# Patient Record
Sex: MALE | Race: WHITE | NOT HISPANIC OR LATINO | Employment: OTHER | URBAN - METROPOLITAN AREA
[De-identification: names, ages, dates, MRNs, and addresses within clinical notes are randomized per-mention and may not be internally consistent; named-entity substitution may affect disease eponyms.]

---

## 2023-03-30 ENCOUNTER — TELEPHONE (OUTPATIENT)
Dept: PRIMARY CARE | Facility: CLINIC | Age: 73
End: 2023-03-30
Payer: MEDICARE

## 2023-03-30 DIAGNOSIS — U07.1 COVID-19: Primary | ICD-10-CM

## 2023-03-30 PROBLEM — R50.9 FEVER: Status: ACTIVE | Noted: 2023-03-30

## 2023-03-30 PROBLEM — I25.10 CORONARY ARTERY DISEASE: Status: ACTIVE | Noted: 2023-03-30

## 2023-03-30 PROBLEM — K63.5 COLON POLYPS: Status: ACTIVE | Noted: 2023-03-30

## 2023-03-30 PROBLEM — E66.3 OVERWEIGHT: Status: ACTIVE | Noted: 2023-03-30

## 2023-03-30 PROBLEM — K21.9 GERD WITHOUT ESOPHAGITIS: Status: ACTIVE | Noted: 2023-03-30

## 2023-03-30 PROBLEM — J01.41 ACUTE RECURRENT PANSINUSITIS: Status: ACTIVE | Noted: 2023-03-30

## 2023-03-30 PROBLEM — B02.9 HERPES ZOSTER: Status: ACTIVE | Noted: 2023-03-30

## 2023-03-30 PROBLEM — R00.1 BRADYCARDIA: Status: ACTIVE | Noted: 2023-03-30

## 2023-03-30 PROBLEM — R00.2 PALPITATIONS: Status: ACTIVE | Noted: 2023-03-30

## 2023-03-30 PROBLEM — E78.00 PURE HYPERCHOLESTEROLEMIA: Status: ACTIVE | Noted: 2023-03-30

## 2023-03-30 PROBLEM — K13.70 LESION OF BUCCAL MUCOSA: Status: ACTIVE | Noted: 2023-03-30

## 2023-03-30 PROBLEM — R07.89 CHEST PAIN, ATYPICAL: Status: ACTIVE | Noted: 2023-03-30

## 2023-03-30 PROBLEM — Z86.39 HISTORY OF VITAMIN D DEFICIENCY: Status: ACTIVE | Noted: 2023-03-30

## 2023-03-30 PROBLEM — R53.83 FATIGUE: Status: ACTIVE | Noted: 2023-03-30

## 2023-03-30 RX ORDER — CLOPIDOGREL BISULFATE 75 MG/1
75 TABLET ORAL DAILY
COMMUNITY
End: 2023-12-18 | Stop reason: SDUPTHER

## 2023-03-30 RX ORDER — EZETIMIBE 10 MG/1
10 TABLET ORAL DAILY
COMMUNITY
End: 2024-05-22

## 2023-03-30 RX ORDER — NITROGLYCERIN 0.4 MG/1
0.4 TABLET SUBLINGUAL EVERY 5 MIN PRN
COMMUNITY

## 2023-03-30 RX ORDER — NIRMATRELVIR AND RITONAVIR 300-100 MG
3 KIT ORAL 2 TIMES DAILY
Qty: 30 TABLET | Refills: 0 | Status: SHIPPED | OUTPATIENT
Start: 2023-03-30 | End: 2023-04-04

## 2023-03-30 RX ORDER — ATORVASTATIN CALCIUM 80 MG/1
80 TABLET, FILM COATED ORAL DAILY
COMMUNITY
End: 2023-03-30 | Stop reason: SINTOL

## 2023-03-30 RX ORDER — FAMOTIDINE 40 MG/1
1 TABLET, FILM COATED ORAL 2 TIMES DAILY
COMMUNITY
Start: 2022-03-30

## 2023-03-30 RX ORDER — ZINC GLUCONATE 50 MG
500 TABLET ORAL DAILY
COMMUNITY

## 2023-03-30 RX ORDER — ERGOCALCIFEROL 1.25 MG/1
2 CAPSULE ORAL DAILY
COMMUNITY

## 2023-03-30 RX ORDER — ISOSORBIDE MONONITRATE 30 MG/1
30 TABLET, EXTENDED RELEASE ORAL DAILY
COMMUNITY

## 2023-03-30 RX ORDER — FLUTICASONE PROPIONATE 50 MCG
1 SPRAY, SUSPENSION (ML) NASAL DAILY
COMMUNITY
End: 2023-11-09 | Stop reason: SDUPTHER

## 2023-03-30 RX ORDER — ASPIRIN 81 MG/1
1 TABLET ORAL DAILY
COMMUNITY

## 2023-03-30 NOTE — TELEPHONE ENCOUNTER
Patient called stating he has fever, body aches, fatigue, sore throat and congestion. He has a positive COVID test this morning and wants to know if you can prescribe paxlovid to [facility] on file .

## 2023-04-22 LAB
APPEARANCE, URINE: CLEAR
BILIRUBIN, URINE: NEGATIVE
BLOOD, URINE: ABNORMAL
CALCIUM OXALATE CRYSTALS, URINE: ABNORMAL /HPF
COLOR, URINE: YELLOW
GLUCOSE, URINE: NEGATIVE MG/DL
KETONES, URINE: NEGATIVE MG/DL
LEUKOCYTE ESTERASE, URINE: NEGATIVE
MUCUS, URINE: ABNORMAL /LPF
NITRITE, URINE: NEGATIVE
PH, URINE: 5 (ref 5–8)
PROSTATE SPECIFIC AG (NG/ML) IN SER/PLAS: 8.65 NG/ML (ref 0–4)
PROTEIN, URINE: NEGATIVE MG/DL
RBC, URINE: 5 /HPF (ref 0–5)
SPECIFIC GRAVITY, URINE: 1.02 (ref 1–1.03)
UROBILINOGEN, URINE: <2 MG/DL (ref 0–1.9)
WBC, URINE: 5 /HPF (ref 0–5)

## 2023-05-04 LAB — PROSTATE SPECIFIC AG (NG/ML) IN SER/PLAS: 3.2 NG/ML (ref 0–4)

## 2023-10-03 ENCOUNTER — TELEPHONE (OUTPATIENT)
Dept: PRIMARY CARE | Facility: CLINIC | Age: 73
End: 2023-10-03
Payer: MEDICARE

## 2023-10-04 NOTE — TELEPHONE ENCOUNTER
Pt called stating that he has pulled a muscle mid to lower back 3 weeks ago and would like rx to be sent to pharmacy. Pt is also wondering if you recommend COVID buster and RSV to be done at pharmacy.

## 2023-10-05 DIAGNOSIS — S39.012A STRAIN OF LUMBAR REGION, INITIAL ENCOUNTER: Primary | ICD-10-CM

## 2023-10-05 RX ORDER — METHOCARBAMOL 500 MG/1
500 TABLET, FILM COATED ORAL 4 TIMES DAILY PRN
Qty: 40 TABLET | Refills: 3 | Status: SHIPPED | OUTPATIENT
Start: 2023-10-05 | End: 2024-06-01

## 2023-10-22 DIAGNOSIS — U07.1 COVID-19: Primary | ICD-10-CM

## 2023-11-02 ENCOUNTER — TELEPHONE (OUTPATIENT)
Dept: PRIMARY CARE | Facility: CLINIC | Age: 73
End: 2023-11-02
Payer: MEDICARE

## 2023-11-02 NOTE — TELEPHONE ENCOUNTER
Shaheen had covid 2 weeks ago and had paxlovid. He is feeling much better but wants to know how soon can he get the Covid Booster and the RSV after testing positive for covid?

## 2023-11-03 ENCOUNTER — LAB (OUTPATIENT)
Dept: LAB | Facility: LAB | Age: 73
End: 2023-11-03
Payer: MEDICARE

## 2023-11-03 DIAGNOSIS — C61 MALIGNANT NEOPLASM OF PROSTATE (MULTI): Primary | ICD-10-CM

## 2023-11-03 LAB — PSA SERPL-MCNC: 1.33 NG/ML

## 2023-11-03 PROCEDURE — 84153 ASSAY OF PSA TOTAL: CPT

## 2023-11-03 PROCEDURE — 36415 COLL VENOUS BLD VENIPUNCTURE: CPT

## 2023-11-07 DIAGNOSIS — J01.41 ACUTE RECURRENT PANSINUSITIS: Primary | ICD-10-CM

## 2023-11-08 PROBLEM — M79.642 LEFT HAND PAIN: Status: ACTIVE | Noted: 2023-08-07

## 2023-11-08 PROBLEM — Z86.010 HISTORY OF ADENOMATOUS POLYP OF COLON: Status: ACTIVE | Noted: 2023-11-08

## 2023-11-08 PROBLEM — E78.00 HYPERCHOLESTEROLEMIA WITHOUT HYPERTRIGLYCERIDEMIA: Status: ACTIVE | Noted: 2023-11-08

## 2023-11-08 PROBLEM — R13.10 DYSPHAGIA: Status: ACTIVE | Noted: 2023-11-08

## 2023-11-08 PROBLEM — M19.90 ARTHRITIS: Status: ACTIVE | Noted: 2023-11-08

## 2023-11-08 PROBLEM — M25.442 EFFUSION OF JOINT OF LEFT HAND: Status: ACTIVE | Noted: 2023-08-07

## 2023-11-08 PROBLEM — M85.80 OSTEOPENIA: Status: ACTIVE | Noted: 2023-11-08

## 2023-11-08 PROBLEM — M48.061 LUMBAR FORAMINAL STENOSIS: Status: ACTIVE | Noted: 2023-11-08

## 2023-11-08 PROBLEM — Z86.0101 HISTORY OF ADENOMATOUS POLYP OF COLON: Status: ACTIVE | Noted: 2023-11-08

## 2023-11-08 PROBLEM — I49.9 IRREGULAR HEART RHYTHM: Status: ACTIVE | Noted: 2023-11-08

## 2023-11-08 PROBLEM — M48.07 LUMBOSACRAL STENOSIS: Status: ACTIVE | Noted: 2023-11-08

## 2023-11-08 PROBLEM — M51.26 LUMBAR DISC HERNIATION: Status: ACTIVE | Noted: 2023-11-08

## 2023-11-08 PROBLEM — S60.00XA CONTUSION OF FINGER OF LEFT HAND: Status: ACTIVE | Noted: 2023-08-07

## 2023-11-08 PROBLEM — N20.0 KIDNEY STONES: Status: ACTIVE | Noted: 2023-11-08

## 2023-11-08 PROBLEM — M54.16 LUMBAR NEURITIS: Status: ACTIVE | Noted: 2023-11-08

## 2023-11-08 RX ORDER — ATORVASTATIN CALCIUM 80 MG/1
80 TABLET, FILM COATED ORAL DAILY
COMMUNITY

## 2023-11-08 RX ORDER — SODIUM, POTASSIUM,MAG SULFATES 17.5-3.13G
SOLUTION, RECONSTITUTED, ORAL ORAL
COMMUNITY
Start: 2022-03-30 | End: 2024-01-22

## 2023-11-08 RX ORDER — LEVOFLOXACIN 750 MG/1
TABLET ORAL
COMMUNITY
End: 2024-02-09 | Stop reason: WASHOUT

## 2023-11-08 RX ORDER — OMEPRAZOLE 20 MG/1
TABLET, DELAYED RELEASE ORAL
COMMUNITY
Start: 2010-01-26 | End: 2024-02-09 | Stop reason: WASHOUT

## 2023-11-08 RX ORDER — LANOLIN ALCOHOL/MO/W.PET/CERES
1 CREAM (GRAM) TOPICAL EVERY OTHER DAY
COMMUNITY
Start: 2023-01-03

## 2023-11-08 RX ORDER — SIMVASTATIN 20 MG/1
TABLET, FILM COATED ORAL
COMMUNITY
Start: 2010-01-26 | End: 2024-01-04 | Stop reason: WASHOUT

## 2023-11-08 RX ORDER — TAMSULOSIN HYDROCHLORIDE 0.4 MG/1
CAPSULE ORAL
COMMUNITY
Start: 2023-10-19

## 2023-11-09 DIAGNOSIS — J01.41 ACUTE RECURRENT PANSINUSITIS: Primary | ICD-10-CM

## 2023-11-09 RX ORDER — FLUTICASONE PROPIONATE 50 MCG
1 SPRAY, SUSPENSION (ML) NASAL DAILY
Qty: 16 G | Refills: 0 | Status: SHIPPED | OUTPATIENT
Start: 2023-11-09

## 2023-11-09 RX ORDER — FLUTICASONE PROPIONATE 50 MCG
1 SPRAY, SUSPENSION (ML) NASAL DAILY
Qty: 16 G | Refills: 0 | Status: SHIPPED | OUTPATIENT
Start: 2023-11-09 | End: 2024-04-02

## 2023-12-18 DIAGNOSIS — I25.10 CORONARY ARTERY DISEASE, UNSPECIFIED VESSEL OR LESION TYPE, UNSPECIFIED WHETHER ANGINA PRESENT, UNSPECIFIED WHETHER NATIVE OR TRANSPLANTED HEART: Primary | ICD-10-CM

## 2023-12-18 RX ORDER — CLOPIDOGREL BISULFATE 75 MG/1
75 TABLET ORAL DAILY
Qty: 90 TABLET | Refills: 0 | Status: SHIPPED | OUTPATIENT
Start: 2023-12-18 | End: 2024-02-09 | Stop reason: WASHOUT

## 2023-12-26 PROBLEM — E78.00 HYPERCHOLESTEROLEMIA WITHOUT HYPERTRIGLYCERIDEMIA: Status: RESOLVED | Noted: 2023-11-08 | Resolved: 2023-12-26

## 2024-01-04 ENCOUNTER — APPOINTMENT (OUTPATIENT)
Dept: CARDIOLOGY | Facility: CLINIC | Age: 74
End: 2024-01-04
Payer: MEDICARE

## 2024-01-04 ENCOUNTER — ANCILLARY PROCEDURE (OUTPATIENT)
Dept: CARDIOLOGY | Facility: CLINIC | Age: 74
End: 2024-01-04
Payer: MEDICARE

## 2024-01-04 ENCOUNTER — OFFICE VISIT (OUTPATIENT)
Dept: CARDIOLOGY | Facility: CLINIC | Age: 74
End: 2024-01-04
Payer: MEDICARE

## 2024-01-04 VITALS
WEIGHT: 181.6 LBS | BODY MASS INDEX: 28.44 KG/M2 | OXYGEN SATURATION: 97 % | DIASTOLIC BLOOD PRESSURE: 80 MMHG | SYSTOLIC BLOOD PRESSURE: 125 MMHG | HEART RATE: 84 BPM

## 2024-01-04 DIAGNOSIS — R00.2 PALPITATION: ICD-10-CM

## 2024-01-04 DIAGNOSIS — R00.1 BRADYCARDIA: ICD-10-CM

## 2024-01-04 DIAGNOSIS — R06.02 SOB (SHORTNESS OF BREATH): ICD-10-CM

## 2024-01-04 DIAGNOSIS — E78.00 PURE HYPERCHOLESTEROLEMIA: ICD-10-CM

## 2024-01-04 DIAGNOSIS — R00.2 PALPITATION: Primary | ICD-10-CM

## 2024-01-04 DIAGNOSIS — I25.10 CORONARY ARTERY DISEASE INVOLVING NATIVE CORONARY ARTERY OF NATIVE HEART, UNSPECIFIED WHETHER ANGINA PRESENT: ICD-10-CM

## 2024-01-04 PROBLEM — J01.41 ACUTE RECURRENT PANSINUSITIS: Status: RESOLVED | Noted: 2023-03-30 | Resolved: 2024-01-04

## 2024-01-04 PROCEDURE — 93000 ELECTROCARDIOGRAM COMPLETE: CPT | Performed by: INTERNAL MEDICINE

## 2024-01-04 PROCEDURE — 93272 ECG/REVIEW INTERPRET ONLY: CPT | Performed by: INTERNAL MEDICINE

## 2024-01-04 PROCEDURE — 1159F MED LIST DOCD IN RCRD: CPT | Performed by: INTERNAL MEDICINE

## 2024-01-04 PROCEDURE — 1160F RVW MEDS BY RX/DR IN RCRD: CPT | Performed by: INTERNAL MEDICINE

## 2024-01-04 PROCEDURE — 1036F TOBACCO NON-USER: CPT | Performed by: INTERNAL MEDICINE

## 2024-01-04 PROCEDURE — 99215 OFFICE O/P EST HI 40 MIN: CPT | Performed by: INTERNAL MEDICINE

## 2024-01-04 NOTE — PROGRESS NOTES
Chief Complaint:   Follow-up (Patient states he is here for a 6 month follow up)     History Of Present Illness:    Shaheen Washington is a 73 y.o. male presenting with CV dz.  Some POSEY-going up hill.Seems worse sine Covid  Has had recent palpitations-better after taking magnesium supplements/decreasing caffeine    No definite angina  [place]       Last Recorded Vitals:  Vitals:    01/04/24 1258 01/04/24 1340   BP: 130/84 125/80   BP Location: Left arm    Patient Position: Sitting    BP Cuff Size: Large adult    Pulse: 84    SpO2: 97%    Weight: 82.4 kg (181 lb 9.6 oz)             Allergies:  Atorvastatin and Penicillins    Outpatient Medications:  Current Outpatient Medications   Medication Instructions    aspirin 81 mg EC tablet 1 tablet, oral, Daily    atorvastatin (LIPITOR) 80 mg, oral, Daily    clopidogrel (PLAVIX) 75 mg, oral, Daily    cyanocobalamin (Vitamin B-12) 1,000 mcg tablet 1 tablet, oral, Every other day    ergocalciferol (Vitamin D-2) 1.25 MG (70302 UT) capsule 2 tablets, oral, Daily    ezetimibe (ZETIA) 10 mg, oral, Daily    famotidine (Pepcid) 40 mg tablet 1 tablet, oral, 2 times daily    fluticasone (Flonase) 50 mcg/actuation nasal spray 1 spray, Each Nostril, Daily    fluticasone (Flonase) 50 mcg/actuation nasal spray 1 spray, Each Nostril, Daily    isosorbide mononitrate ER (IMDUR) 30 mg, oral, Daily    levoFLOXacin (Levaquin) 750 mg tablet TAKE 1 TABLET BY MOUTH ONCE DAILY MORNING OF THE PROCEDURE    magnesium oxide 500 mg, oral, Daily    methocarbamol (ROBAXIN) 500 mg, oral, 4 times daily PRN    nirmatrelvir-ritonavir (PAXLOVID) 300 mg (150 mg x 2)-100 mg tablet therapy pack 2 tabs of 150 mg with one tab of 100mg twice daily until pack is finished    nitroglycerin (NITROSTAT) 0.4 mg, sublingual, Every 5 min PRN    omeprazole OTC (PriLOSEC OTC) 20 mg EC tablet oral    sodium,potassium,mag sulfates (Suprep Bowel Prep Kit) 17.5-3.13-1.6 gram recon soln solution See Instructions, see  instructions, # 1 kits, Refill(s) 0, Date: 03/30/2022 13:40:00 EDT, Pharmacy: [id number], Instructions Replace Required Details, see instructions, Chronic GERD, 170, 06/03/2020 10:44:00 EDT, Height/Length Dosing, cm, 7...    tamsulosin (Flomax) 0.4 mg 24 hr capsule     ubidecarenone (COENZYME Q10, BULK, MISC) 1 capsule, oral, Daily       Physical Exam:  Constitutional:       Appearance: Healthy appearance. Not in distress.   Neck:      Vascular: No JVR. JVD normal.   Pulmonary:      Effort: Pulmonary effort is normal.      Breath sounds: Normal breath sounds. No wheezing. No rhonchi. No rales.   Chest:      Chest wall: Not tender to palpatation.   Cardiovascular:      PMI at left midclavicular line. Normal rate. Regular rhythm. Normal S1. Normal S2.       Murmurs: There is no murmur.      No gallop.  No click. No rub.   Pulses:     Intact distal pulses.   Edema:     Peripheral edema absent.   Abdominal:      General: Bowel sounds are normal.      Palpations: Abdomen is soft.      Tenderness: There is no abdominal tenderness.   Musculoskeletal: Normal range of motion.         General: No tenderness. Skin:     General: Skin is warm and dry.   Neurological:      General: No focal deficit present.      Mental Status: Alert and oriented to person, place and time.          Last Labs:  CBC -  Lab Results   Component Value Date    WBC 6.5 01/18/2023    HGB 15.6 01/18/2023    HCT 46.3 01/18/2023    MCV 88 01/18/2023     01/18/2023       CMP -  Lab Results   Component Value Date    CALCIUM 9.4 01/18/2023    PROT 6.4 01/18/2023    ALBUMIN 4.0 01/18/2023    AST 21 01/18/2023    ALT 19 01/18/2023    ALKPHOS 61 01/18/2023    BILITOT 1.0 01/18/2023       LIPID PANEL -   Lab Results   Component Value Date    CHOL 106 01/18/2023    TRIG 61 01/18/2023    HDL 50.8 01/18/2023    CHHDL 2.1 01/18/2023    LDLF 43 01/18/2023    VLDL 12 01/18/2023       RENAL FUNCTION PANEL -   Lab Results   Component Value Date    GLUCOSE  93 01/18/2023     01/18/2023    K 4.7 01/18/2023     01/18/2023    CO2 26 01/18/2023    ANIONGAP 15 01/18/2023    BUN 11 01/18/2023    CREATININE 0.93 01/18/2023    GFRMALE 87 01/18/2023    CALCIUM 9.4 01/18/2023    ALBUMIN 4.0 01/18/2023        Lab Results   Component Value Date    BNP 52 10/02/2022           Lab review: I have personally reviewed the laboratory result(s)       Problem List Items Addressed This Visit       Bradycardia    Overview     Resolved after stopping beta blocker           Relevant Orders    ECG 12 lead (Clinic Performed) (Completed)    Coronary artery disease    Overview     11/2013 STEMI: TARIQ x2 (LAD/ramus - occlusion of small PDA)  No definite angina but POSEY-check stress test  10/2021 stress test with good exercise tolerance / NL perfusion / NL LVEF   On ASA / statin / beta blocker / Plavix            Palpitation - Primary    Overview     Related to PACs in the past   Had ED visit 10/2/2022 with palpitations in setting of caffeine use - improved   Now more palpitations-check EM         Pure hypercholesterolemia    Overview     On HI statin   6/2021 lipids with LDL=91   LDL increased from 6/2020 @ 71   Discussed adding Zetia but declined   12/2021 LDL=73 thus started Zetia   1/2023 LDL=43 -recheck         SOB (shortness of breath)    Overview     Uncertain  etiology  No signs CHF and in NSR  Possibly post Covid etiology-check echo  Also consider anginal equivalent-check stress test                30 day EM=palpitations  Echo=SOB/palp  Ex nuc stress test=SOB/CAD    Kris Cottrell DO

## 2024-01-08 ENCOUNTER — LAB (OUTPATIENT)
Dept: LAB | Facility: LAB | Age: 74
End: 2024-01-08
Payer: MEDICARE

## 2024-01-08 DIAGNOSIS — E78.00 PURE HYPERCHOLESTEROLEMIA: ICD-10-CM

## 2024-01-08 LAB
CHOLEST SERPL-MCNC: 106 MG/DL (ref 0–199)
CHOLESTEROL/HDL RATIO: 2.4
HDLC SERPL-MCNC: 44.9 MG/DL
LDLC SERPL CALC-MCNC: 40 MG/DL
NON HDL CHOLESTEROL: 61 MG/DL (ref 0–149)
TRIGL SERPL-MCNC: 104 MG/DL (ref 0–149)
VLDL: 21 MG/DL (ref 0–40)

## 2024-01-08 PROCEDURE — 80061 LIPID PANEL: CPT

## 2024-01-08 PROCEDURE — 36415 COLL VENOUS BLD VENIPUNCTURE: CPT

## 2024-01-16 ENCOUNTER — APPOINTMENT (OUTPATIENT)
Dept: RADIOLOGY | Facility: CLINIC | Age: 74
End: 2024-01-16
Payer: MEDICARE

## 2024-01-16 ENCOUNTER — TELEPHONE (OUTPATIENT)
Dept: PRIMARY CARE | Facility: CLINIC | Age: 74
End: 2024-01-16
Payer: MEDICARE

## 2024-01-16 ENCOUNTER — APPOINTMENT (OUTPATIENT)
Dept: CARDIOLOGY | Facility: CLINIC | Age: 74
End: 2024-01-16
Payer: MEDICARE

## 2024-01-18 ENCOUNTER — ANCILLARY PROCEDURE (OUTPATIENT)
Dept: RADIOLOGY | Facility: CLINIC | Age: 74
End: 2024-01-18
Payer: MEDICARE

## 2024-01-18 ENCOUNTER — HOSPITAL ENCOUNTER (OUTPATIENT)
Dept: CARDIOLOGY | Facility: CLINIC | Age: 74
Discharge: HOME | End: 2024-01-18
Payer: MEDICARE

## 2024-01-18 VITALS
DIASTOLIC BLOOD PRESSURE: 80 MMHG | BODY MASS INDEX: 28.41 KG/M2 | HEIGHT: 67 IN | WEIGHT: 181 LBS | SYSTOLIC BLOOD PRESSURE: 125 MMHG

## 2024-01-18 DIAGNOSIS — I25.10 CORONARY ARTERY DISEASE INVOLVING NATIVE CORONARY ARTERY OF NATIVE HEART, UNSPECIFIED WHETHER ANGINA PRESENT: Primary | ICD-10-CM

## 2024-01-18 DIAGNOSIS — I25.10 CAD (CORONARY ARTERY DISEASE): Primary | ICD-10-CM

## 2024-01-18 DIAGNOSIS — R06.02 SOB (SHORTNESS OF BREATH): ICD-10-CM

## 2024-01-18 DIAGNOSIS — I25.10 CORONARY ARTERY DISEASE INVOLVING NATIVE CORONARY ARTERY OF NATIVE HEART, UNSPECIFIED WHETHER ANGINA PRESENT: ICD-10-CM

## 2024-01-18 LAB
AORTIC VALVE MEAN GRADIENT: 2.6
AORTIC VALVE PEAK VELOCITY: 1.17
AV PEAK GRADIENT: 5.5
AVA (PEAK VEL): 2.15
AVA (VTI): 2.04
EJECTION FRACTION APICAL 4 CHAMBER: 63.5
EJECTION FRACTION: 66
LEFT ATRIUM VOLUME AREA LENGTH INDEX BSA: 25.3
LEFT VENTRICLE INTERNAL DIMENSION DIASTOLE: 3.68 (ref 3.5–6)
LEFT VENTRICULAR OUTFLOW TRACT DIAMETER: 2.03
MITRAL VALVE E/A RATIO: 0.61
RIGHT VENTRICLE FREE WALL PEAK S': 12
RIGHT VENTRICLE PEAK SYSTOLIC PRESSURE: 18.3
TRICUSPID ANNULAR PLANE SYSTOLIC EXCURSION: 2.3

## 2024-01-18 PROCEDURE — 93017 CV STRESS TEST TRACING ONLY: CPT

## 2024-01-18 PROCEDURE — 93306 TTE W/DOPPLER COMPLETE: CPT | Performed by: INTERNAL MEDICINE

## 2024-01-18 PROCEDURE — 93016 CV STRESS TEST SUPVJ ONLY: CPT | Performed by: INTERNAL MEDICINE

## 2024-01-18 PROCEDURE — 93306 TTE W/DOPPLER COMPLETE: CPT

## 2024-01-18 PROCEDURE — 2500000004 HC RX 250 GENERAL PHARMACY W/ HCPCS (ALT 636 FOR OP/ED): Performed by: INTERNAL MEDICINE

## 2024-01-18 PROCEDURE — 78452 HT MUSCLE IMAGE SPECT MULT: CPT | Performed by: INTERNAL MEDICINE

## 2024-01-18 PROCEDURE — 78452 HT MUSCLE IMAGE SPECT MULT: CPT

## 2024-01-18 RX ADMIN — PERFLUTREN 2 ML OF DILUTION: 6.52 INJECTION, SUSPENSION INTRAVENOUS at 08:45

## 2024-01-22 ENCOUNTER — HOSPITAL ENCOUNTER (OUTPATIENT)
Dept: RADIOLOGY | Facility: CLINIC | Age: 74
Discharge: HOME | End: 2024-01-22
Payer: MEDICARE

## 2024-01-22 ENCOUNTER — OFFICE VISIT (OUTPATIENT)
Dept: PRIMARY CARE | Facility: CLINIC | Age: 74
End: 2024-01-22
Payer: MEDICARE

## 2024-01-22 VITALS
HEART RATE: 76 BPM | DIASTOLIC BLOOD PRESSURE: 82 MMHG | BODY MASS INDEX: 28.97 KG/M2 | TEMPERATURE: 96.9 F | SYSTOLIC BLOOD PRESSURE: 112 MMHG | WEIGHT: 184.6 LBS | HEIGHT: 67 IN | OXYGEN SATURATION: 98 %

## 2024-01-22 DIAGNOSIS — U07.1 COVID-19: ICD-10-CM

## 2024-01-22 DIAGNOSIS — I49.9 IRREGULAR HEART RHYTHM: ICD-10-CM

## 2024-01-22 DIAGNOSIS — R00.2 PALPITATION: ICD-10-CM

## 2024-01-22 DIAGNOSIS — K21.9 GASTROESOPHAGEAL REFLUX DISEASE WITHOUT ESOPHAGITIS: ICD-10-CM

## 2024-01-22 DIAGNOSIS — C61 MALIGNANT NEOPLASM OF PROSTATE (MULTI): Primary | ICD-10-CM

## 2024-01-22 PROCEDURE — 1159F MED LIST DOCD IN RCRD: CPT | Performed by: FAMILY MEDICINE

## 2024-01-22 PROCEDURE — 1126F AMNT PAIN NOTED NONE PRSNT: CPT | Performed by: FAMILY MEDICINE

## 2024-01-22 PROCEDURE — 1036F TOBACCO NON-USER: CPT | Performed by: FAMILY MEDICINE

## 2024-01-22 PROCEDURE — 99213 OFFICE O/P EST LOW 20 MIN: CPT | Performed by: FAMILY MEDICINE

## 2024-01-22 PROCEDURE — 71046 X-RAY EXAM CHEST 2 VIEWS: CPT

## 2024-01-22 PROCEDURE — 71046 X-RAY EXAM CHEST 2 VIEWS: CPT | Performed by: RADIOLOGY

## 2024-01-22 ASSESSMENT — ENCOUNTER SYMPTOMS
CONSTITUTIONAL NEGATIVE: 1
SHORTNESS OF BREATH: 1
PALPITATIONS: 1

## 2024-01-22 ASSESSMENT — PAIN SCALES - GENERAL: PAINLEVEL: 0-NO PAIN

## 2024-01-22 NOTE — PROGRESS NOTES
"Subjective   Patient ID: Shaheen Washington is a 73 y.o. male who presents for c/o (C/o coughing, drainage, sob, and irregular heart beat(only x 3 days), tired x3 1/2 months).    HPI     Review of Systems   Constitutional: Negative.    Respiratory:  Positive for shortness of breath.    Cardiovascular:  Positive for palpitations.        Did see Dr Cottrell normal stress test has event monitor in place       Objective   /82 (BP Location: Left arm)   Pulse 76   Temp 36.1 °C (96.9 °F) (Temporal)   Ht 1.702 m (5' 7\")   Wt 83.7 kg (184 lb 9.6 oz)   SpO2 98%   BMI 28.91 kg/m²     Physical Exam  Vitals and nursing note reviewed.   Constitutional:       Appearance: Normal appearance.   HENT:      Right Ear: Tympanic membrane normal.      Left Ear: Tympanic membrane normal.      Mouth/Throat:      Pharynx: Oropharynx is clear.   Cardiovascular:      Rate and Rhythm: Normal rate and regular rhythm.      Heart sounds: Normal heart sounds.   Pulmonary:      Breath sounds: Normal breath sounds.   Neurological:      Mental Status: He is alert.         Assessment/Plan patient seen here with some persistent cough since he had COVID.  He did see cardiology his stress test was normal he is wearing an event monitor presently.  We are going to get a chest x-ray today.  I think this may be related more to his reflux as opposed to pneumonia.  I have asked him to use his famotidine every day for the next 2 to 4 weeks and let me know what happens.  Problem List Items Addressed This Visit             ICD-10-CM    COVID-19 U07.1    Relevant Orders    XR chest 2 views    Palpitation R00.2    Irregular heart rhythm I49.9    GERD (gastroesophageal reflux disease) K21.9    Malignant neoplasm of prostate (CMS/HCC) - Primary C61          "

## 2024-01-29 ENCOUNTER — APPOINTMENT (OUTPATIENT)
Dept: CARDIOLOGY | Facility: CLINIC | Age: 74
End: 2024-01-29
Payer: MEDICARE

## 2024-02-01 ENCOUNTER — LAB (OUTPATIENT)
Dept: LAB | Facility: LAB | Age: 74
End: 2024-02-01
Payer: MEDICARE

## 2024-02-01 DIAGNOSIS — C61 MALIGNANT NEOPLASM OF PROSTATE (MULTI): Primary | ICD-10-CM

## 2024-02-01 LAB — PSA SERPL-MCNC: 0.66 NG/ML

## 2024-02-01 PROCEDURE — 84153 ASSAY OF PSA TOTAL: CPT

## 2024-02-06 ENCOUNTER — APPOINTMENT (OUTPATIENT)
Dept: CARDIOLOGY | Facility: CLINIC | Age: 74
End: 2024-02-06
Payer: MEDICARE

## 2024-02-06 PROBLEM — R00.2 PALPITATIONS: Status: ACTIVE | Noted: 2022-10-03

## 2024-02-06 PROBLEM — Z86.79 HISTORY OF HYPERTENSION: Status: ACTIVE | Noted: 2024-02-06

## 2024-02-06 PROBLEM — S61.012A LACERATION OF LEFT THUMB: Status: ACTIVE | Noted: 2024-01-27

## 2024-02-09 ENCOUNTER — OFFICE VISIT (OUTPATIENT)
Dept: CARDIOLOGY | Facility: CLINIC | Age: 74
End: 2024-02-09
Payer: MEDICARE

## 2024-02-09 VITALS
DIASTOLIC BLOOD PRESSURE: 70 MMHG | SYSTOLIC BLOOD PRESSURE: 132 MMHG | WEIGHT: 179 LBS | BODY MASS INDEX: 28.04 KG/M2 | HEART RATE: 83 BPM | OXYGEN SATURATION: 97 %

## 2024-02-09 DIAGNOSIS — I25.10 CORONARY ARTERY DISEASE INVOLVING NATIVE CORONARY ARTERY OF NATIVE HEART, UNSPECIFIED WHETHER ANGINA PRESENT: ICD-10-CM

## 2024-02-09 DIAGNOSIS — E78.00 PURE HYPERCHOLESTEROLEMIA: Primary | ICD-10-CM

## 2024-02-09 DIAGNOSIS — R00.2 PALPITATIONS: ICD-10-CM

## 2024-02-09 DIAGNOSIS — R06.02 SOB (SHORTNESS OF BREATH): ICD-10-CM

## 2024-02-09 PROCEDURE — 99214 OFFICE O/P EST MOD 30 MIN: CPT | Performed by: INTERNAL MEDICINE

## 2024-02-09 PROCEDURE — 1036F TOBACCO NON-USER: CPT | Performed by: INTERNAL MEDICINE

## 2024-02-09 PROCEDURE — 1159F MED LIST DOCD IN RCRD: CPT | Performed by: INTERNAL MEDICINE

## 2024-02-09 PROCEDURE — 1126F AMNT PAIN NOTED NONE PRSNT: CPT | Performed by: INTERNAL MEDICINE

## 2024-02-09 PROCEDURE — 1160F RVW MEDS BY RX/DR IN RCRD: CPT | Performed by: INTERNAL MEDICINE

## 2024-02-09 NOTE — PROGRESS NOTES
Chief Complaint:   Follow-up (Patient states they are here for a follow up after testing. )     History Of Present Illness:    Shaheen Washington is a 73 y.o. male presenting with testing F/U  Palpitations largely resolved  Also cough/sob better    No definite angina  [place]    Concerned about excess bleeding with Plavix       Last Recorded Vitals:  Vitals:    02/09/24 1339   BP: 132/80   BP Location: Left arm   Patient Position: Sitting   BP Cuff Size: Adult   Pulse: 83   SpO2: 97%   Weight: 81.2 kg (179 lb)            Allergies:  Penicillins    Outpatient Medications:  Current Outpatient Medications   Medication Instructions    aspirin 81 mg EC tablet 1 tablet, oral, Daily    atorvastatin (LIPITOR) 80 mg, oral, Daily    clopidogrel (PLAVIX) 75 mg, oral, Daily    cyanocobalamin (Vitamin B-12) 1,000 mcg tablet 1 tablet, oral, Every other day    ergocalciferol (Vitamin D-2) 1.25 MG (53699 UT) capsule 2 tablets, oral, Daily    ezetimibe (ZETIA) 10 mg, oral, Daily    famotidine (Pepcid) 40 mg tablet 1 tablet, oral, 2 times daily    fluticasone (Flonase) 50 mcg/actuation nasal spray 1 spray, Each Nostril, Daily    fluticasone (Flonase) 50 mcg/actuation nasal spray 1 spray, Each Nostril, Daily    isosorbide mononitrate ER (IMDUR) 30 mg, oral, Daily    magnesium oxide 500 mg, oral, Daily    methocarbamol (ROBAXIN) 500 mg, oral, 4 times daily PRN    nitroglycerin (NITROSTAT) 0.4 mg, sublingual, Every 5 min PRN    omeprazole OTC (PriLOSEC OTC) 20 mg EC tablet oral    tamsulosin (Flomax) 0.4 mg 24 hr capsule     ubidecarenone (COENZYME Q10, BULK, MISC) 1 capsule, oral, Daily       Physical Exam:  Constitutional:       Appearance: Healthy appearance. Not in distress.   Neck:      Vascular: No JVR. JVD normal.   Pulmonary:      Effort: Pulmonary effort is normal.      Breath sounds: Normal breath sounds. No wheezing. No rhonchi. No rales.   Chest:      Chest wall: Not tender to palpatation.   Cardiovascular:       PMI at left midclavicular line. Normal rate. Regular rhythm. Normal S1. Normal S2.       Murmurs: There is no murmur.      No gallop.  No click. No rub.   Pulses:     Intact distal pulses.   Edema:     Peripheral edema absent.   Abdominal:      General: Bowel sounds are normal.      Palpations: Abdomen is soft.      Tenderness: There is no abdominal tenderness.   Musculoskeletal: Normal range of motion.         General: No tenderness. Skin:     General: Skin is warm and dry.   Neurological:      General: No focal deficit present.      Mental Status: Alert and oriented to person, place and time.          Last Labs:  CBC -  Lab Results   Component Value Date    WBC 6.5 01/18/2023    HGB 15.6 01/18/2023    HCT 46.3 01/18/2023    MCV 88 01/18/2023     01/18/2023       CMP -  Lab Results   Component Value Date    CALCIUM 9.4 01/18/2023    PROT 6.4 01/18/2023    ALBUMIN 4.0 01/18/2023    AST 21 01/18/2023    ALT 19 01/18/2023    ALKPHOS 61 01/18/2023    BILITOT 1.0 01/18/2023       LIPID PANEL -   Lab Results   Component Value Date    CHOL 106 01/08/2024    TRIG 104 01/08/2024    HDL 44.9 01/08/2024    CHHDL 2.4 01/08/2024    LDLF 43 01/18/2023    VLDL 21 01/08/2024    NHDL 61 01/08/2024       RENAL FUNCTION PANEL -   Lab Results   Component Value Date    GLUCOSE 93 01/18/2023     01/18/2023    K 4.7 01/18/2023     01/18/2023    CO2 26 01/18/2023    ANIONGAP 15 01/18/2023    BUN 11 01/18/2023    CREATININE 0.93 01/18/2023    GFRMALE 87 01/18/2023    CALCIUM 9.4 01/18/2023    ALBUMIN 4.0 01/18/2023        Lab Results   Component Value Date    BNP 52 10/02/2022           Lab review: I have personally reviewed the laboratory result(s)       Problem List Items Addressed This Visit       SOB (shortness of breath)    Overview     Uncertain  etiology  No signs CHF and in NSR  Possibly post Covid etiology-check echo  Also consider anginal equivalent-check stress test                Stop  Plavix(clopidogrel)  Kris Cottrell, DO

## 2024-04-02 DIAGNOSIS — J01.41 ACUTE RECURRENT PANSINUSITIS: ICD-10-CM

## 2024-04-02 RX ORDER — FLUTICASONE PROPIONATE 50 MCG
1 SPRAY, SUSPENSION (ML) NASAL DAILY
Qty: 16 G | Refills: 0 | Status: SHIPPED | OUTPATIENT
Start: 2024-04-02

## 2024-04-03 ENCOUNTER — TELEPHONE (OUTPATIENT)
Dept: PRIMARY CARE | Facility: CLINIC | Age: 74
End: 2024-04-03
Payer: MEDICARE

## 2024-04-03 DIAGNOSIS — J01.90 ACUTE NON-RECURRENT SINUSITIS, UNSPECIFIED LOCATION: Primary | ICD-10-CM

## 2024-04-03 RX ORDER — AZITHROMYCIN 250 MG/1
TABLET, FILM COATED ORAL
Qty: 6 TABLET | Refills: 0 | Status: SHIPPED | OUTPATIENT
Start: 2024-04-03 | End: 2024-04-08

## 2024-04-03 NOTE — TELEPHONE ENCOUNTER
Pt is calling sinus problems, c/o left ear pressure and both ears both  x 1 1/2 wk is requesting antibiotic send to pharm

## 2024-05-21 DIAGNOSIS — I25.10 CORONARY ARTERY DISEASE INVOLVING NATIVE CORONARY ARTERY OF NATIVE HEART, UNSPECIFIED WHETHER ANGINA PRESENT: ICD-10-CM

## 2024-05-21 DIAGNOSIS — E78.00 PURE HYPERCHOLESTEROLEMIA: ICD-10-CM

## 2024-05-22 RX ORDER — EZETIMIBE 10 MG/1
10 TABLET ORAL DAILY
Qty: 90 TABLET | Refills: 2 | Status: SHIPPED | OUTPATIENT
Start: 2024-05-22

## 2024-06-19 DIAGNOSIS — J01.41 ACUTE RECURRENT PANSINUSITIS: ICD-10-CM

## 2024-06-19 RX ORDER — SODIUM, POTASSIUM,MAG SULFATES 17.5-3.13G
SOLUTION, RECONSTITUTED, ORAL ORAL
COMMUNITY
Start: 2024-05-21

## 2024-06-20 RX ORDER — FLUTICASONE PROPIONATE 50 MCG
1 SPRAY, SUSPENSION (ML) NASAL DAILY
Qty: 16 G | Refills: 0 | Status: SHIPPED | OUTPATIENT
Start: 2024-06-20

## 2024-07-12 DIAGNOSIS — Z85.46 ENCOUNTER FOR FOLLOW-UP SURVEILLANCE OF PROSTATE CANCER: Primary | ICD-10-CM

## 2024-07-12 DIAGNOSIS — Z08 ENCOUNTER FOR FOLLOW-UP SURVEILLANCE OF PROSTATE CANCER: Primary | ICD-10-CM

## 2024-07-16 ENCOUNTER — LAB (OUTPATIENT)
Dept: LAB | Facility: LAB | Age: 74
End: 2024-07-16
Payer: MEDICARE

## 2024-07-16 DIAGNOSIS — Z08 ENCOUNTER FOR FOLLOW-UP SURVEILLANCE OF PROSTATE CANCER: ICD-10-CM

## 2024-07-16 DIAGNOSIS — Z85.46 ENCOUNTER FOR FOLLOW-UP SURVEILLANCE OF PROSTATE CANCER: ICD-10-CM

## 2024-07-16 LAB — PSA SERPL-MCNC: 0.49 NG/ML

## 2024-07-17 PROBLEM — I10 HYPERTENSION: Status: ACTIVE | Noted: 2024-02-06

## 2024-07-17 PROBLEM — K64.4 EXTERNAL HEMORRHOIDS: Status: ACTIVE | Noted: 2024-06-27

## 2024-07-31 ENCOUNTER — OFFICE VISIT (OUTPATIENT)
Dept: UROLOGY | Facility: CLINIC | Age: 74
End: 2024-07-31
Payer: MEDICARE

## 2024-07-31 VITALS
DIASTOLIC BLOOD PRESSURE: 70 MMHG | BODY MASS INDEX: 26.51 KG/M2 | HEIGHT: 69 IN | TEMPERATURE: 97.8 F | WEIGHT: 179 LBS | HEART RATE: 73 BPM | SYSTOLIC BLOOD PRESSURE: 114 MMHG

## 2024-07-31 DIAGNOSIS — R39.89 OTHER SYMPTOMS AND SIGNS INVOLVING THE GENITOURINARY SYSTEM: ICD-10-CM

## 2024-07-31 DIAGNOSIS — R35.1 NOCTURIA: ICD-10-CM

## 2024-07-31 DIAGNOSIS — N20.0 KIDNEY STONE: Primary | ICD-10-CM

## 2024-07-31 DIAGNOSIS — C61 MALIGNANT NEOPLASM OF PROSTATE (MULTI): ICD-10-CM

## 2024-07-31 LAB
POC APPEARANCE, URINE: CLEAR
POC BILIRUBIN, URINE: ABNORMAL
POC BLOOD, URINE: ABNORMAL
POC COLOR, URINE: ABNORMAL
POC GLUCOSE, URINE: NEGATIVE MG/DL
POC KETONES, URINE: ABNORMAL MG/DL
POC LEUKOCYTES, URINE: ABNORMAL
POC NITRITE,URINE: NEGATIVE
POC PH, URINE: 5 PH
POC PROTEIN, URINE: NEGATIVE MG/DL
POC SPECIFIC GRAVITY, URINE: >=1.03
POC UROBILINOGEN, URINE: 0.2 EU/DL

## 2024-07-31 PROCEDURE — 81003 URINALYSIS AUTO W/O SCOPE: CPT | Mod: QW | Performed by: UROLOGY

## 2024-07-31 PROCEDURE — 99215 OFFICE O/P EST HI 40 MIN: CPT | Performed by: UROLOGY

## 2024-07-31 PROCEDURE — 1159F MED LIST DOCD IN RCRD: CPT | Performed by: UROLOGY

## 2024-07-31 PROCEDURE — 3074F SYST BP LT 130 MM HG: CPT | Performed by: UROLOGY

## 2024-07-31 PROCEDURE — 3008F BODY MASS INDEX DOCD: CPT | Performed by: UROLOGY

## 2024-07-31 PROCEDURE — 3078F DIAST BP <80 MM HG: CPT | Performed by: UROLOGY

## 2024-07-31 PROCEDURE — 1126F AMNT PAIN NOTED NONE PRSNT: CPT | Performed by: UROLOGY

## 2024-07-31 SDOH — ECONOMIC STABILITY: FOOD INSECURITY: WITHIN THE PAST 12 MONTHS, THE FOOD YOU BOUGHT JUST DIDN'T LAST AND YOU DIDN'T HAVE MONEY TO GET MORE.: NEVER TRUE

## 2024-07-31 SDOH — ECONOMIC STABILITY: FOOD INSECURITY: WITHIN THE PAST 12 MONTHS, YOU WORRIED THAT YOUR FOOD WOULD RUN OUT BEFORE YOU GOT MONEY TO BUY MORE.: NEVER TRUE

## 2024-07-31 ASSESSMENT — ENCOUNTER SYMPTOMS
OCCASIONAL FEELINGS OF UNSTEADINESS: 0
LOSS OF SENSATION IN FEET: 0
DEPRESSION: 0

## 2024-07-31 ASSESSMENT — COLUMBIA-SUICIDE SEVERITY RATING SCALE - C-SSRS
2. HAVE YOU ACTUALLY HAD ANY THOUGHTS OF KILLING YOURSELF?: NO
6. HAVE YOU EVER DONE ANYTHING, STARTED TO DO ANYTHING, OR PREPARED TO DO ANYTHING TO END YOUR LIFE?: NO
1. IN THE PAST MONTH, HAVE YOU WISHED YOU WERE DEAD OR WISHED YOU COULD GO TO SLEEP AND NOT WAKE UP?: NO

## 2024-07-31 ASSESSMENT — PAIN SCALES - GENERAL: PAINLEVEL: 0-NO PAIN

## 2024-07-31 ASSESSMENT — PATIENT HEALTH QUESTIONNAIRE - PHQ9
2. FEELING DOWN, DEPRESSED OR HOPELESS: NOT AT ALL
1. LITTLE INTEREST OR PLEASURE IN DOING THINGS: NOT AT ALL
SUM OF ALL RESPONSES TO PHQ9 QUESTIONS 1 AND 2: 0

## 2024-07-31 ASSESSMENT — LIFESTYLE VARIABLES
HOW MANY STANDARD DRINKS CONTAINING ALCOHOL DO YOU HAVE ON A TYPICAL DAY: 1 OR 2
SKIP TO QUESTIONS 9-10: 1
HOW OFTEN DO YOU HAVE SIX OR MORE DRINKS ON ONE OCCASION: NEVER
HOW OFTEN DO YOU HAVE A DRINK CONTAINING ALCOHOL: 2-3 TIMES A WEEK
AUDIT-C TOTAL SCORE: 3

## 2024-07-31 NOTE — PROGRESS NOTES
Subjective   Patient ID: Shaheen Washington is a 74 y.o. male who presents for Prostate Cancer (6 month follow up).   HPI  PT PRESENTS FOR A F/U ON HIS H/O PROSTATE CANCER TREATED WITH IMAGE GUIDED RADIATION THERAPY.  THE PT IS DOING WELL AT THIS TIME.  NO NEW PAIN OR DISCOMFORT.   Are you experiencing:  Burning on urination -- NO  Pain on urination  -- NO  Urinary frequency -- NO  Urinary urgency -- NO  Urge incontinence -- NO  Urinary stress incontinence  NO   NO ENEURESIS  Nocturia-- 2 X ON AVG  Hematuria -- NO  Hesitancy -- NO  Post void fullness --   NO    Review of Systems  General-- No C/O fever or chills  Head-- No C/O Dizziness  Eyes-- NO  C/O blurry or double vision  Ears-- No C/O hearing loss  Neck-- Supple  Chest-- No C/O pain or discomfort  Lungs-- No C/O shortness of breath  Abdomen-- No C/O  pain or discomfort, No nausea or vomiting  Back-- PT C/O back AND NECK  pain or discomfort -- H/O L-S DDD WITH SPINAL STENOSIS  Extremities-- No C/O swelling or pain    Objective   Physical Exam    General-- well developed, well nourished in NAD  Head-- normal cephalic, atraumatic  Eyes-- PERRL, EOM'S FROM,  no  jaundice  Neck-- Supple, without masses  Chest-- Normal bony structure  Abdomen-- soft, non tender, liver spleen not tender. No supra pubic masses  Back-- no flank masses palpable, no CVA tenderness on palpation or perc;ussion  Lymph nodes-- No inguinal lymphadenopathy noted  Prostate-- 1+, firm, smooth, non tender,without nodules  Testis-- both down, non tender, without masses  Epididymis-- no masses palpable  Scrotum -- no hydrocele noted  Extremities -- Normal muscle mass and tone for the patients age  Neurological-- oriented times three    PSA  1-3-18-- 2.46  1-31-19-- 2.23  2-4-20-- 2.49  2-9-21-- 3.51  4-16-22--  2.68  4-22-23-- 8.65  5-4-23-- 3.2  2-1-24 - 0.66  7-16-24 -- 0.49    PVR -- 17 ML    Assessment/Plan   A:  H/O PROSTATE CANCER - PT S/P TRUSP BX 5-12-23-- 7/12 BX'S POSITIVE FOR ADENOCARCINOMA OF  THE PROSTATE -- 5/7 BX'S HENNY'S SCORE - 6 ( 3 + 3 ), 1/7 BX'S HENNY'S SCORE - 7 ( 3 + 4 ), 1/7 BX'S HENNY'S SCORE - 8 ( 4 + 4 )  PT S/P RADIATION THERAPY 7-31-23 THRU 10-2-23  PSA REMAINS LOW    PMH:  H/O KIDNEY STONES -- PT S/P ESWL OF A RIGHT RENAL CALCULUS 1-8-05  REMOTE COVID INFECTION 1-18-21   [facility] 2013 -- S/P STENT H/O ED -- PT USES SILDENAFIL PRN  DOING WELL;  P:  REASSURANCE, EDUCATION RENDERED TO THE PT  F/U IN 6 MONTHS FOR A SEBAS AND PSA RE CK  KUB 6 MONTHS TO SEE IF ANY NEW KIDNEY STONES HAVE FORMED     Perry Jamil MD 07/31/24 11:50 AM

## 2024-08-08 ENCOUNTER — APPOINTMENT (OUTPATIENT)
Dept: CARDIOLOGY | Facility: CLINIC | Age: 74
End: 2024-08-08
Payer: MEDICARE

## 2024-08-08 VITALS
HEART RATE: 72 BPM | DIASTOLIC BLOOD PRESSURE: 66 MMHG | BODY MASS INDEX: 27.02 KG/M2 | SYSTOLIC BLOOD PRESSURE: 103 MMHG | OXYGEN SATURATION: 100 % | WEIGHT: 183 LBS

## 2024-08-08 DIAGNOSIS — R00.1 BRADYCARDIA: ICD-10-CM

## 2024-08-08 DIAGNOSIS — R00.2 PALPITATIONS: ICD-10-CM

## 2024-08-08 DIAGNOSIS — E78.00 PURE HYPERCHOLESTEROLEMIA: Primary | ICD-10-CM

## 2024-08-08 DIAGNOSIS — I25.10 CORONARY ARTERY DISEASE INVOLVING NATIVE CORONARY ARTERY OF NATIVE HEART, UNSPECIFIED WHETHER ANGINA PRESENT: ICD-10-CM

## 2024-08-08 PROCEDURE — 3078F DIAST BP <80 MM HG: CPT | Performed by: INTERNAL MEDICINE

## 2024-08-08 PROCEDURE — G2211 COMPLEX E/M VISIT ADD ON: HCPCS | Performed by: INTERNAL MEDICINE

## 2024-08-08 PROCEDURE — 1036F TOBACCO NON-USER: CPT | Performed by: INTERNAL MEDICINE

## 2024-08-08 PROCEDURE — 3074F SYST BP LT 130 MM HG: CPT | Performed by: INTERNAL MEDICINE

## 2024-08-08 PROCEDURE — 1159F MED LIST DOCD IN RCRD: CPT | Performed by: INTERNAL MEDICINE

## 2024-08-08 PROCEDURE — 99213 OFFICE O/P EST LOW 20 MIN: CPT | Performed by: INTERNAL MEDICINE

## 2024-08-08 NOTE — PROGRESS NOTES
Chief Complaint:   Follow-up (Patient is here for a follow up)     History Of Present Illness:    Shaheen Washington is a 74 y.o. male presenting with testing F/U  Feeling pretty good  Patient denies chest pain/SOB/palpitations/dizziness/lightheadedness/edema/claudication    Active-cardio 3 times per week/light weights         Last Recorded Vitals:  Vitals:    08/08/24 1323   BP: 103/66   BP Location: Left arm   Patient Position: Sitting   BP Cuff Size: Adult   Pulse: 72   SpO2: 100%   Weight: 83 kg (183 lb)            Allergies:  Penicillins    Outpatient Medications:  Current Outpatient Medications   Medication Instructions    aspirin 81 mg EC tablet 1 tablet, oral, Daily    atorvastatin (LIPITOR) 80 mg, oral, Daily    cyanocobalamin (Vitamin B-12) 1,000 mcg tablet 1 tablet, oral, Every other day    ergocalciferol (Vitamin D-2) 1.25 MG (74706 UT) capsule 2 tablets, oral, Daily    ezetimibe (ZETIA) 10 mg, oral, Daily    famotidine (Pepcid) 40 mg tablet 1 tablet, oral, 2 times daily    fluticasone (Flonase) 50 mcg/actuation nasal spray 1 spray, Each Nostril, Daily    fluticasone (Flonase) 50 mcg/actuation nasal spray 1 spray, Each Nostril, Daily    isosorbide mononitrate ER (IMDUR) 30 mg, oral, Daily    magnesium oxide 500 mg, oral, Daily    methocarbamol (ROBAXIN) 500 mg, oral, 4 times daily PRN    nitroglycerin (NITROSTAT) 0.4 mg, sublingual, Every 5 min PRN    tamsulosin (Flomax) 0.4 mg 24 hr capsule     ubidecarenone (COENZYME Q10, BULK, MISC) 1 capsule, oral, Daily       Physical Exam:  Constitutional:       Appearance: Healthy appearance. Not in distress.   Neck:      Vascular: No JVR. JVD normal.   Pulmonary:      Effort: Pulmonary effort is normal.      Breath sounds: Normal breath sounds. No wheezing. No rhonchi. No rales.   Chest:      Chest wall: Not tender to palpatation.   Cardiovascular:      PMI at left midclavicular line. Normal rate. Regular rhythm. Normal S1. Normal S2.       Murmurs: There is no murmur.       No gallop.  No click. No rub.   Pulses:     Intact distal pulses.   Edema:     Peripheral edema absent.   Abdominal:      General: Bowel sounds are normal.      Palpations: Abdomen is soft.      Tenderness: There is no abdominal tenderness.   Musculoskeletal: Normal range of motion.         General: No tenderness. Skin:     General: Skin is warm and dry.   Neurological:      General: No focal deficit present.      Mental Status: Alert and oriented to person, place and time.          Last Labs:  CBC -  Lab Results   Component Value Date    WBC 6.5 01/18/2023    HGB 15.6 01/18/2023    HCT 46.3 01/18/2023    MCV 88 01/18/2023     01/18/2023       CMP -  Lab Results   Component Value Date    CALCIUM 9.4 01/18/2023    PROT 6.4 01/18/2023    ALBUMIN 4.0 01/18/2023    AST 21 01/18/2023    ALT 19 01/18/2023    ALKPHOS 61 01/18/2023    BILITOT 1.0 01/18/2023       LIPID PANEL -   Lab Results   Component Value Date    CHOL 106 01/08/2024    TRIG 104 01/08/2024    HDL 44.9 01/08/2024    CHHDL 2.4 01/08/2024    LDLF 43 01/18/2023    VLDL 21 01/08/2024    NHDL 61 01/08/2024       RENAL FUNCTION PANEL -   Lab Results   Component Value Date    GLUCOSE 93 01/18/2023     01/18/2023    K 4.7 01/18/2023     01/18/2023    CO2 26 01/18/2023    ANIONGAP 15 01/18/2023    BUN 11 01/18/2023    CREATININE 0.93 01/18/2023    GFRMALE 87 01/18/2023    CALCIUM 9.4 01/18/2023    ALBUMIN 4.0 01/18/2023        Lab Results   Component Value Date    BNP 52 10/02/2022           Lab review: I have personally reviewed the laboratory result(s)       Problem List Items Addressed This Visit       Bradycardia    Overview     Resolved after stopping beta blocker           Coronary artery disease    Overview     11/2013 STEMI: TARIQ x2 (LAD/ramus - occlusion of small PDA)  No definite angina  1/2024 stress test with good exercise tolerance / NL perfusion / NL LVEF   On ASA / statin / beta blocker.              Palpitations    Overview      Related to PACs in the past   Had ED visit 10/2/2022 with palpitations in setting of caffeine use - improved   1/2024 EM with PACS,no other findings         Pure hypercholesterolemia - Primary    Overview     On HI statin   6/2021 lipids with LDL=91   LDL increased from 6/2020 @ 71   Discussed adding Zetia but declined   12/2021 LDL=73 thus started Zetia   1/2024 lipids excellent          Lipids-Sy Cottrell, DO

## 2024-08-15 DIAGNOSIS — I49.9 IRREGULAR HEART RHYTHM: ICD-10-CM

## 2024-08-16 RX ORDER — ISOSORBIDE MONONITRATE 30 MG/1
30 TABLET, EXTENDED RELEASE ORAL DAILY
Qty: 90 TABLET | Refills: 3 | Status: SHIPPED | OUTPATIENT
Start: 2024-08-16

## 2024-08-24 DIAGNOSIS — I25.10 CORONARY ARTERY DISEASE INVOLVING NATIVE CORONARY ARTERY OF NATIVE HEART, UNSPECIFIED WHETHER ANGINA PRESENT: ICD-10-CM

## 2024-08-24 DIAGNOSIS — E78.00 PURE HYPERCHOLESTEROLEMIA: ICD-10-CM

## 2024-08-24 DIAGNOSIS — R00.2 PALPITATIONS: ICD-10-CM

## 2024-08-27 RX ORDER — ATORVASTATIN CALCIUM 80 MG/1
80 TABLET, FILM COATED ORAL DAILY
Qty: 90 TABLET | Refills: 3 | Status: SHIPPED | OUTPATIENT
Start: 2024-08-27

## 2024-09-03 DIAGNOSIS — J01.41 ACUTE RECURRENT PANSINUSITIS: ICD-10-CM

## 2024-09-04 RX ORDER — FLUTICASONE PROPIONATE 50 MCG
1 SPRAY, SUSPENSION (ML) NASAL DAILY
Qty: 16 G | Refills: 0 | Status: SHIPPED | OUTPATIENT
Start: 2024-09-04

## 2024-11-07 DIAGNOSIS — J01.41 ACUTE RECURRENT PANSINUSITIS: ICD-10-CM

## 2024-11-07 RX ORDER — FLUTICASONE PROPIONATE 50 MCG
1 SPRAY, SUSPENSION (ML) NASAL DAILY
Qty: 16 G | Refills: 0 | Status: SHIPPED | OUTPATIENT
Start: 2024-11-07

## 2024-11-11 ENCOUNTER — TELEPHONE (OUTPATIENT)
Dept: PRIMARY CARE | Facility: CLINIC | Age: 74
End: 2024-11-11
Payer: MEDICARE

## 2024-11-11 DIAGNOSIS — J01.90 ACUTE NON-RECURRENT SINUSITIS, UNSPECIFIED LOCATION: Primary | ICD-10-CM

## 2024-11-11 RX ORDER — DOXYCYCLINE 100 MG/1
100 CAPSULE ORAL 2 TIMES DAILY
Qty: 14 CAPSULE | Refills: 0 | Status: SHIPPED | OUTPATIENT
Start: 2024-11-11 | End: 2024-11-18

## 2024-11-11 NOTE — TELEPHONE ENCOUNTER
Pt was here in January & was covid negative but has been having sinus, congestions mucosy, and hacking cough for weeks.  Can he have abx?   [facility]-[phone]  Penicillin allergy  Ty

## 2024-12-16 DIAGNOSIS — J40 BRONCHITIS: Primary | ICD-10-CM

## 2024-12-16 RX ORDER — AZITHROMYCIN 250 MG/1
TABLET, FILM COATED ORAL
Qty: 6 TABLET | Refills: 0 | Status: SHIPPED | OUTPATIENT
Start: 2024-12-16 | End: 2024-12-21

## 2025-01-02 ENCOUNTER — APPOINTMENT (OUTPATIENT)
Dept: PRIMARY CARE | Facility: CLINIC | Age: 75
End: 2025-01-02
Payer: MEDICARE

## 2025-01-02 VITALS
DIASTOLIC BLOOD PRESSURE: 82 MMHG | OXYGEN SATURATION: 98 % | HEIGHT: 67 IN | SYSTOLIC BLOOD PRESSURE: 120 MMHG | BODY MASS INDEX: 30.39 KG/M2 | TEMPERATURE: 97.1 F | WEIGHT: 193.6 LBS | HEART RATE: 84 BPM

## 2025-01-02 DIAGNOSIS — C61 MALIGNANT NEOPLASM OF PROSTATE (MULTI): ICD-10-CM

## 2025-01-02 DIAGNOSIS — I25.10 CORONARY ARTERY DISEASE INVOLVING NATIVE CORONARY ARTERY OF NATIVE HEART, UNSPECIFIED WHETHER ANGINA PRESENT: ICD-10-CM

## 2025-01-02 DIAGNOSIS — I10 HYPERTENSION, UNSPECIFIED TYPE: Primary | ICD-10-CM

## 2025-01-02 DIAGNOSIS — R05.3 CHRONIC COUGH: ICD-10-CM

## 2025-01-02 DIAGNOSIS — J32.9 CHRONIC SINUSITIS, UNSPECIFIED LOCATION: ICD-10-CM

## 2025-01-02 PROCEDURE — 3008F BODY MASS INDEX DOCD: CPT | Performed by: FAMILY MEDICINE

## 2025-01-02 PROCEDURE — 1159F MED LIST DOCD IN RCRD: CPT | Performed by: FAMILY MEDICINE

## 2025-01-02 PROCEDURE — 3079F DIAST BP 80-89 MM HG: CPT | Performed by: FAMILY MEDICINE

## 2025-01-02 PROCEDURE — 1036F TOBACCO NON-USER: CPT | Performed by: FAMILY MEDICINE

## 2025-01-02 PROCEDURE — G2211 COMPLEX E/M VISIT ADD ON: HCPCS | Performed by: FAMILY MEDICINE

## 2025-01-02 PROCEDURE — 3074F SYST BP LT 130 MM HG: CPT | Performed by: FAMILY MEDICINE

## 2025-01-02 PROCEDURE — 99213 OFFICE O/P EST LOW 20 MIN: CPT | Performed by: FAMILY MEDICINE

## 2025-01-02 PROCEDURE — 1126F AMNT PAIN NOTED NONE PRSNT: CPT | Performed by: FAMILY MEDICINE

## 2025-01-02 PROCEDURE — 1160F RVW MEDS BY RX/DR IN RCRD: CPT | Performed by: FAMILY MEDICINE

## 2025-01-02 ASSESSMENT — ENCOUNTER SYMPTOMS
CONSTITUTIONAL NEGATIVE: 1
DEPRESSION: 0
CARDIOVASCULAR NEGATIVE: 1
OCCASIONAL FEELINGS OF UNSTEADINESS: 0
LOSS OF SENSATION IN FEET: 0
COUGH: 1

## 2025-01-02 ASSESSMENT — PAIN SCALES - GENERAL: PAINLEVEL_OUTOF10: 0-NO PAIN

## 2025-01-02 NOTE — PROGRESS NOTES
"Subjective   Patient ID: Shaheen Washington is a 74 y.o. male who presents for c/o (Coughing , drainage x 7 months on and off ).    HPI     Review of Systems   Constitutional: Negative.    HENT:  Positive for congestion and postnasal drip.    Respiratory:  Positive for cough.    Cardiovascular: Negative.        Objective   /82 (BP Location: Left arm)   Pulse 84   Temp 36.2 °C (97.1 °F) (Temporal)   Ht 1.702 m (5' 7\")   Wt 87.8 kg (193 lb 9.6 oz)   SpO2 98%   BMI 30.32 kg/m²     Physical Exam  Vitals and nursing note reviewed.   Constitutional:       Appearance: Normal appearance.   HENT:      Right Ear: Tympanic membrane normal.      Left Ear: Tympanic membrane normal.   Cardiovascular:      Rate and Rhythm: Regular rhythm.      Heart sounds: Normal heart sounds.   Pulmonary:      Breath sounds: Normal breath sounds.   Neurological:      General: No focal deficit present.      Mental Status: He is alert and oriented to person, place, and time.   Psychiatric:         Mood and Affect: Mood normal.         Behavior: Behavior normal.         Assessment/Plan patient seen here with persistent sinus drainage chronic cough this has been going on possibly since he had COVID 2 to 3 years ago.  We are getting CAT scans of his sinuses and of his chest.  He will continue Mucinex for now  Problem List Items Addressed This Visit             ICD-10-CM    Coronary artery disease I25.10    Malignant neoplasm of prostate (Multi) C61    Hypertension - Primary I10     Other Visit Diagnoses         Codes    Chronic sinusitis, unspecified location     J32.9    Relevant Orders    CT sinus wo IV contrast    Chronic cough     R05.3    Relevant Orders    CT chest wo IV contrast               "

## 2025-01-09 ENCOUNTER — HOSPITAL ENCOUNTER (OUTPATIENT)
Dept: RADIOLOGY | Facility: CLINIC | Age: 75
Discharge: HOME | End: 2025-01-09
Payer: MEDICARE

## 2025-01-09 DIAGNOSIS — J32.9 CHRONIC SINUSITIS, UNSPECIFIED LOCATION: ICD-10-CM

## 2025-01-09 DIAGNOSIS — R05.3 CHRONIC COUGH: ICD-10-CM

## 2025-01-09 PROCEDURE — 71250 CT THORAX DX C-: CPT

## 2025-01-09 PROCEDURE — 71250 CT THORAX DX C-: CPT | Performed by: RADIOLOGY

## 2025-01-09 PROCEDURE — 70486 CT MAXILLOFACIAL W/O DYE: CPT | Performed by: RADIOLOGY

## 2025-01-09 PROCEDURE — 70486 CT MAXILLOFACIAL W/O DYE: CPT

## 2025-01-13 ENCOUNTER — APPOINTMENT (OUTPATIENT)
Dept: RADIOLOGY | Facility: CLINIC | Age: 75
End: 2025-01-13
Payer: MEDICARE

## 2025-01-14 DIAGNOSIS — J32.9 CHRONIC SINUSITIS, UNSPECIFIED LOCATION: Primary | ICD-10-CM

## 2025-01-18 DIAGNOSIS — J01.41 ACUTE RECURRENT PANSINUSITIS: ICD-10-CM

## 2025-01-20 RX ORDER — FLUTICASONE PROPIONATE 50 MCG
1 SPRAY, SUSPENSION (ML) NASAL DAILY
Qty: 16 G | Refills: 1 | Status: SHIPPED | OUTPATIENT
Start: 2025-01-20

## 2025-01-21 PROBLEM — U07.1 COVID-19: Status: RESOLVED | Noted: 2023-03-30 | Resolved: 2025-01-21

## 2025-01-21 PROBLEM — M99.33 OSSEOUS STENOSIS OF NEURAL CANAL OF LUMBAR REGION: Status: ACTIVE | Noted: 2024-09-09

## 2025-01-21 PROBLEM — Z96.641 STATUS POST TOTAL HIP REPLACEMENT, RIGHT: Status: ACTIVE | Noted: 2024-09-09

## 2025-01-23 ENCOUNTER — HOSPITAL ENCOUNTER (OUTPATIENT)
Dept: RADIOLOGY | Facility: CLINIC | Age: 75
Discharge: HOME | End: 2025-01-23
Payer: MEDICARE

## 2025-01-23 ENCOUNTER — LAB (OUTPATIENT)
Dept: LAB | Facility: LAB | Age: 75
End: 2025-01-23
Payer: MEDICARE

## 2025-01-23 DIAGNOSIS — E78.00 PURE HYPERCHOLESTEROLEMIA: ICD-10-CM

## 2025-01-23 DIAGNOSIS — C61 MALIGNANT NEOPLASM OF PROSTATE (MULTI): ICD-10-CM

## 2025-01-23 LAB
CHOLEST SERPL-MCNC: 128 MG/DL (ref 0–199)
CHOLESTEROL/HDL RATIO: 2.6
HDLC SERPL-MCNC: 48.9 MG/DL
LDLC SERPL CALC-MCNC: 56 MG/DL
NON HDL CHOLESTEROL: 79 MG/DL (ref 0–149)
PSA SERPL-MCNC: 0.54 NG/ML
TRIGL SERPL-MCNC: 114 MG/DL (ref 0–149)
VLDL: 23 MG/DL (ref 0–40)

## 2025-01-23 PROCEDURE — 84153 ASSAY OF PSA TOTAL: CPT

## 2025-01-23 PROCEDURE — 74018 RADEX ABDOMEN 1 VIEW: CPT

## 2025-01-23 PROCEDURE — 80061 LIPID PANEL: CPT

## 2025-02-01 NOTE — PROGRESS NOTES
Subjective   Patient ID: Shaheen Washington is a 74 y.o. male who presents for A F/U ON HIS H/O PROSTATE CANCER AND  H/O  KIDNEY STONE DISEASE .  PT DENIES ANY NEW PAIN OR DISCOMFORT    HPI  Are you experiencing:  Burning on urination -- NO  Pain on urination  -- NO  Urinary frequency -- OCC WHEN DRINKING 2-3 COKES   Urinary urgency -- NO  Urge incontinence --NO  Urinary stress incontinence  -- NO  Number of pads used per day --NO  Enuresis -- NO  Nocturia-- 2 X ON AVG  Hematuria -- NO  Hesitancy -- NO  Post void fullness -- NO      ROS  General-- No C/O fever or chills  Head-- No C/O Dizziness  Eyes-- NO  C/O blurry or double vision  Ears-- No C/O hearing loss  Neck-- Supple  Chest-- NO C/O pain or discomfort  Lungs-- OCC  C/O shortness of breath  Abdomen-- No C/O  pain or discomfort, No nausea or vomiting  Back-- PT C/O back pain  -- H/O L-S DDD AND LUMBAR STENOSIS  Extremities-- No C/O swelling or pain    OBJECTIVE  PE  General-- well-developed, well-nourished in NAD  Head-- normal cephalic, atraumatic  Eyes-- PERRL, EOM'S FROM, no jaundice  Neck-- Supple, without masses  Chest-- Normal bony structure  Abdomen-- soft, non tender, liver spleen not palpable. No suprapubic masses.  Back-- no flank masses palpable, no CVA tenderness on palpation or percussion  Lymph nodes-- No inguinal lymphadenopathy noted  Prostate-- FOSSA smooth, non-tender, without nodules  Testis-- both down, non-tender, without masses  Epididymis-- no masses palpable  Scrotum -- no hydrocele noted  Extremities -- Normal muscle mass and tone for the patients age  Neurological-- oriented times three    PSA  1/13/2025--0.54  7/16/2024--0.49  2/1/2024--0.66  5/4/2023--3.2  4/22/2023--8.65  4/16/2022--2.68  2/9/2021--3.51  2/4/2020--2.49  1/31/2019--2.23  1-3-18-- 2.46     PVR -- 17 ML    7-31-25  KUB:  IMPRESSION:  Large right renal calculi.  No gross soft tissue or osseous abnormality. Study does not assess  the prostate or assess for prostate  malignancy.     Assessment/Plan   A:  OLD RECORDS FROM [facility] REVIEWED   H/O PROSTATE CANCER - PT S/P TRUSP BX 5-12-23-- 7/12 BX'S POSITIVE FOR ADENOCARCINOMA OF THE PROSTATE -- 5/7 BX'S HENNY'S SCORE - 6 ( 3 + 3 ), 1/7 BX'S HENNY'S SCORE - 7 ( 3 + 4 ), 1/7 BX'S HENNY'S SCORE - 8 ( 4 + 4 )  PT S/P RADIATION THERAPY 7-31-23 THRU 10-2-23  PSA REMAINS LOW  DOING WELL OVERALL      PMH:  H/O KIDNEY STONES -- PT S/P ESWL OF A RIGHT RENAL CALCULUS 1-8-05  TWO RIGHT RENAL CALCULI - UNCHANGED-- ASYMPTOMATIC  PATHOPHYSIOLOGY OF THE ABOVE AND OPTIONS OF FURTHER THERAPY DISCUSSED IN DETAIL  ALL QUESTIONS ANSWERED    REMOTE COVID INFECTION 1-18-21   [facility] 2013 -- S/P STENT H/O ED -- PT USES SILDENAFIL PRN    P:  REASSURANCE, EDUCATION, SUPPORTIVE CARE  RENDERED TO THE PT  WILL TREAT THE RIGHT RENAL CALCULI CONSERVATIVELY FOR NOW   F/U IN 6 MONTHS FOR A SEBAS AND PSA RE CK    Perry Jamil MD 02/01/25 2:31 PM

## 2025-02-03 ENCOUNTER — APPOINTMENT (OUTPATIENT)
Dept: CARDIOLOGY | Facility: CLINIC | Age: 75
End: 2025-02-03
Payer: MEDICARE

## 2025-02-03 ENCOUNTER — APPOINTMENT (OUTPATIENT)
Dept: UROLOGY | Facility: CLINIC | Age: 75
End: 2025-02-03
Payer: MEDICARE

## 2025-02-03 VITALS
TEMPERATURE: 97.3 F | HEIGHT: 67 IN | BODY MASS INDEX: 28.88 KG/M2 | HEART RATE: 72 BPM | DIASTOLIC BLOOD PRESSURE: 70 MMHG | WEIGHT: 184 LBS | SYSTOLIC BLOOD PRESSURE: 136 MMHG

## 2025-02-03 DIAGNOSIS — R35.1 NOCTURIA: ICD-10-CM

## 2025-02-03 DIAGNOSIS — R39.89 OTHER SYMPTOMS AND SIGNS INVOLVING THE GENITOURINARY SYSTEM: ICD-10-CM

## 2025-02-03 DIAGNOSIS — C61 MALIGNANT NEOPLASM OF PROSTATE (MULTI): ICD-10-CM

## 2025-02-03 DIAGNOSIS — N20.0 KIDNEY STONE: ICD-10-CM

## 2025-02-03 DIAGNOSIS — R35.0 FREQUENCY OF MICTURITION: Primary | ICD-10-CM

## 2025-02-03 PROCEDURE — 1160F RVW MEDS BY RX/DR IN RCRD: CPT | Performed by: UROLOGY

## 2025-02-03 PROCEDURE — 3075F SYST BP GE 130 - 139MM HG: CPT | Performed by: UROLOGY

## 2025-02-03 PROCEDURE — 3078F DIAST BP <80 MM HG: CPT | Performed by: UROLOGY

## 2025-02-03 PROCEDURE — 99214 OFFICE O/P EST MOD 30 MIN: CPT | Performed by: UROLOGY

## 2025-02-03 PROCEDURE — 51798 US URINE CAPACITY MEASURE: CPT | Performed by: UROLOGY

## 2025-02-03 PROCEDURE — 1126F AMNT PAIN NOTED NONE PRSNT: CPT | Performed by: UROLOGY

## 2025-02-03 PROCEDURE — 1159F MED LIST DOCD IN RCRD: CPT | Performed by: UROLOGY

## 2025-02-03 PROCEDURE — 1036F TOBACCO NON-USER: CPT | Performed by: UROLOGY

## 2025-02-03 PROCEDURE — 3008F BODY MASS INDEX DOCD: CPT | Performed by: UROLOGY

## 2025-02-03 RX ORDER — OMEPRAZOLE 20 MG/1
20 TABLET, DELAYED RELEASE ORAL
COMMUNITY

## 2025-02-03 ASSESSMENT — ENCOUNTER SYMPTOMS
LOSS OF SENSATION IN FEET: 0
OCCASIONAL FEELINGS OF UNSTEADINESS: 0
DEPRESSION: 0

## 2025-02-03 ASSESSMENT — PAIN SCALES - GENERAL: PAINLEVEL_OUTOF10: 0-NO PAIN

## 2025-02-03 ASSESSMENT — PATIENT HEALTH QUESTIONNAIRE - PHQ9
1. LITTLE INTEREST OR PLEASURE IN DOING THINGS: NOT AT ALL
2. FEELING DOWN, DEPRESSED OR HOPELESS: NOT AT ALL
SUM OF ALL RESPONSES TO PHQ9 QUESTIONS 1 AND 2: 0

## 2025-02-03 NOTE — LETTER
February 7, 2025     Donal Kurtz DO  [address] [facility]  [address]  [facility] [address]    Patient: Shaheen Washington   YOB: 1950   Date of Visit: 2/3/2025       Dear Dr. Donal Kurtz DO:    Thank you for referring Shaheen Washington to me for evaluation. Below are my notes for this consultation.  If you have questions, please do not hesitate to call me. I look forward to following your patient along with you.       Sincerely,     Perry Jamil MD      CC: No Recipients  ______________________________________________________________________________________    Subjective  Patient ID: Shaheen Washington is a 74 y.o. male who presents for A F/U ON HIS H/O PROSTATE CANCER AND  H/O  KIDNEY STONE DISEASE .  PT DENIES ANY NEW PAIN OR DISCOMFORT    HPI  Are you experiencing:  Burning on urination -- NO  Pain on urination  -- NO  Urinary frequency -- OCC WHEN DRINKING 2-3 COKES   Urinary urgency -- NO  Urge incontinence --NO  Urinary stress incontinence  -- NO  Number of pads used per day --NO  Enuresis -- NO  Nocturia-- 2 X ON AVG  Hematuria -- NO  Hesitancy -- NO  Post void fullness -- NO      ROS  General-- No C/O fever or chills  Head-- No C/O Dizziness  Eyes-- NO  C/O blurry or double vision  Ears-- No C/O hearing loss  Neck-- Supple  Chest-- NO C/O pain or discomfort  Lungs-- OCC  C/O shortness of breath  Abdomen-- No C/O  pain or discomfort, No nausea or vomiting  Back-- PT C/O back pain  -- H/O L-S DDD AND LUMBAR STENOSIS  Extremities-- No C/O swelling or pain    OBJECTIVE  PE  General-- well-developed, well-nourished in NAD  Head-- normal cephalic, atraumatic  Eyes-- PERRL, EOM'S FROM, no jaundice  Neck-- Supple, without masses  Chest-- Normal bony structure  Abdomen-- soft, non tender, liver spleen not palpable. No suprapubic masses.  Back-- no flank masses palpable, no CVA tenderness on palpation or percussion  Lymph nodes-- No inguinal lymphadenopathy noted  Prostate-- FOSSA smooth, non-tender,  without nodules  Testis-- both down, non-tender, without masses  Epididymis-- no masses palpable  Scrotum -- no hydrocele noted  Extremities -- Normal muscle mass and tone for the patients age  Neurological-- oriented times three    PSA  1/13/2025--0.54  7/16/2024--0.49  2/1/2024--0.66  5/4/2023--3.2  4/22/2023--8.65  4/16/2022--2.68  2/9/2021--3.51  2/4/2020--2.49  1/31/2019--2.23  1-3-18-- 2.46     PVR -- 17 ML    7-31-25  KUB:  IMPRESSION:  Large right renal calculi.  No gross soft tissue or osseous abnormality. Study does not assess  the prostate or assess for prostate malignancy.     Assessment/Plan   A:  OLD RECORDS FROM [facility] REVIEWED   H/O PROSTATE CANCER - PT S/P TRUSP BX 5-12-23-- 7/12 BX'S POSITIVE FOR ADENOCARCINOMA OF THE PROSTATE -- 5/7 BX'S HENNY'S SCORE - 6 ( 3 + 3 ), 1/7 BX'S HENNY'S SCORE - 7 ( 3 + 4 ), 1/7 BX'S HENNY'S SCORE - 8 ( 4 + 4 )  PT S/P RADIATION THERAPY 7-31-23 THRU 10-2-23  PSA REMAINS LOW  DOING WELL OVERALL      PMH:  H/O KIDNEY STONES -- PT S/P ESWL OF A RIGHT RENAL CALCULUS 1-8-05  TWO RIGHT RENAL CALCULI - UNCHANGED-- ASYMPTOMATIC  PATHOPHYSIOLOGY OF THE ABOVE AND OPTIONS OF FURTHER THERAPY DISCUSSED IN DETAIL  ALL QUESTIONS ANSWERED    REMOTE COVID INFECTION 1-18-21   REMOTE MI 2013 -- S/P STENT H/O ED -- PT USES SILDENAFIL PRN    P:  REASSURANCE, EDUCATION, SUPPORTIVE CARE  RENDERED TO THE PT  WILL TREAT THE RIGHT RENAL CALCULI CONSERVATIVELY FOR NOW   F/U IN 6 MONTHS FOR A SEBAS AND PSA RE CK    Perry Jamil MD 02/01/25 2:31 PM

## 2025-02-04 DIAGNOSIS — E78.00 PURE HYPERCHOLESTEROLEMIA: ICD-10-CM

## 2025-02-04 RX ORDER — EZETIMIBE 10 MG/1
10 TABLET ORAL DAILY
Qty: 90 TABLET | Refills: 1 | Status: SHIPPED | OUTPATIENT
Start: 2025-02-04

## 2025-02-07 ENCOUNTER — APPOINTMENT (OUTPATIENT)
Dept: PRIMARY CARE | Facility: CLINIC | Age: 75
End: 2025-02-07
Payer: MEDICARE

## 2025-02-07 VITALS
OXYGEN SATURATION: 97 % | SYSTOLIC BLOOD PRESSURE: 122 MMHG | HEART RATE: 72 BPM | DIASTOLIC BLOOD PRESSURE: 76 MMHG | WEIGHT: 191.2 LBS | BODY MASS INDEX: 30.01 KG/M2 | TEMPERATURE: 97.2 F | HEIGHT: 67 IN

## 2025-02-07 DIAGNOSIS — K21.9 GASTROESOPHAGEAL REFLUX DISEASE WITHOUT ESOPHAGITIS: ICD-10-CM

## 2025-02-07 DIAGNOSIS — M48.061 LUMBAR FORAMINAL STENOSIS: ICD-10-CM

## 2025-02-07 DIAGNOSIS — Z00.00 MEDICARE ANNUAL WELLNESS VISIT, SUBSEQUENT: Primary | ICD-10-CM

## 2025-02-07 DIAGNOSIS — C61 MALIGNANT NEOPLASM OF PROSTATE (MULTI): ICD-10-CM

## 2025-02-07 DIAGNOSIS — E78.00 PURE HYPERCHOLESTEROLEMIA: ICD-10-CM

## 2025-02-07 DIAGNOSIS — I10 HYPERTENSION, UNSPECIFIED TYPE: ICD-10-CM

## 2025-02-07 DIAGNOSIS — J32.9 CHRONIC SINUSITIS, UNSPECIFIED LOCATION: ICD-10-CM

## 2025-02-07 PROCEDURE — 1126F AMNT PAIN NOTED NONE PRSNT: CPT | Performed by: FAMILY MEDICINE

## 2025-02-07 PROCEDURE — 99497 ADVNCD CARE PLAN 30 MIN: CPT | Performed by: FAMILY MEDICINE

## 2025-02-07 PROCEDURE — 1036F TOBACCO NON-USER: CPT | Performed by: FAMILY MEDICINE

## 2025-02-07 PROCEDURE — 1160F RVW MEDS BY RX/DR IN RCRD: CPT | Performed by: FAMILY MEDICINE

## 2025-02-07 PROCEDURE — 1159F MED LIST DOCD IN RCRD: CPT | Performed by: FAMILY MEDICINE

## 2025-02-07 PROCEDURE — 1170F FXNL STATUS ASSESSED: CPT | Performed by: FAMILY MEDICINE

## 2025-02-07 PROCEDURE — 3074F SYST BP LT 130 MM HG: CPT | Performed by: FAMILY MEDICINE

## 2025-02-07 PROCEDURE — 3008F BODY MASS INDEX DOCD: CPT | Performed by: FAMILY MEDICINE

## 2025-02-07 PROCEDURE — 99397 PER PM REEVAL EST PAT 65+ YR: CPT | Performed by: FAMILY MEDICINE

## 2025-02-07 PROCEDURE — G0439 PPPS, SUBSEQ VISIT: HCPCS | Performed by: FAMILY MEDICINE

## 2025-02-07 PROCEDURE — 3078F DIAST BP <80 MM HG: CPT | Performed by: FAMILY MEDICINE

## 2025-02-07 ASSESSMENT — ENCOUNTER SYMPTOMS
OCCASIONAL FEELINGS OF UNSTEADINESS: 0
LOSS OF SENSATION IN FEET: 0
DEPRESSION: 0
PSYCHIATRIC NEGATIVE: 1
BACK PAIN: 1
CARDIOVASCULAR NEGATIVE: 1
CONSTITUTIONAL NEGATIVE: 1
RHINORRHEA: 1
GASTROINTESTINAL NEGATIVE: 1
ENDOCRINE NEGATIVE: 1
COUGH: 1

## 2025-02-07 ASSESSMENT — ANXIETY QUESTIONNAIRES
2. NOT BEING ABLE TO STOP OR CONTROL WORRYING: NOT AT ALL
3. WORRYING TOO MUCH ABOUT DIFFERENT THINGS: NOT AT ALL
1. FEELING NERVOUS, ANXIOUS, OR ON EDGE: NOT AT ALL
GAD7 TOTAL SCORE: 0
4. TROUBLE RELAXING: NOT AT ALL
6. BECOMING EASILY ANNOYED OR IRRITABLE: NOT AT ALL
7. FEELING AFRAID AS IF SOMETHING AWFUL MIGHT HAPPEN: NOT AT ALL
5. BEING SO RESTLESS THAT IT IS HARD TO SIT STILL: NOT AT ALL

## 2025-02-07 ASSESSMENT — GERIATRIC MINI NUTRITIONAL ASSESSMENT (MNA)
A HAS FOOD INTAKE DECLINED OVER THE PAST 3 MONTHS DUE TO LOSS OF APPETITE, DIGESTIVE PROBLEMS, CHEWING OR SWALLOWING DIFFICULTIES?: NO DECREASE IN FOOD INTAKE
D HAS SUFFERED PSYCHOLOGICAL STRESS OR ACUTE DISEASE IN THE PAST 3 MONTHS?: NO
C GENERAL MOBILITY: GOES OUT
B WEIGHT LOSS DURING THE LAST 3 MONTHS: NO WEIGHT LOSS

## 2025-02-07 ASSESSMENT — ACTIVITIES OF DAILY LIVING (ADL)
ADEQUATE_TO_COMPLETE_ADL: YES
NEEDS ASSISTANCE WITH FOOD: INDEPENDENT
PATIENT'S MEMORY ADEQUATE TO SAFELY COMPLETE DAILY ACTIVITIES?: YES
BATHING: INDEPENDENT
GROCERY SHOPPING: INDEPENDENT
JUDGMENT_ADEQUATE_SAFELY_COMPLETE_DAILY_ACTIVITIES: YES
EATING: INDEPENDENT
WALKS IN HOME: INDEPENDENT
MANAGING FINANCES: INDEPENDENT
STIL DRIVING: YES
DRESSING YOURSELF: INDEPENDENT
USING TRANSPORTATION: INDEPENDENT
TAKING MEDICATION: INDEPENDENT
GROOMING: INDEPENDENT
PREPARING MEALS: INDEPENDENT
TOILETING: INDEPENDENT
USING TELEPHONE: INDEPENDENT
FEEDING YOURSELF: INDEPENDENT
DOING HOUSEWORK: INDEPENDENT

## 2025-02-07 ASSESSMENT — PAIN SCALES - GENERAL: PAINLEVEL_OUTOF10: 0-NO PAIN

## 2025-02-07 ASSESSMENT — PATIENT HEALTH QUESTIONNAIRE - PHQ9
SUM OF ALL RESPONSES TO PHQ9 QUESTIONS 1 AND 2: 0
1. LITTLE INTEREST OR PLEASURE IN DOING THINGS: NOT AT ALL
2. FEELING DOWN, DEPRESSED OR HOPELESS: NOT AT ALL

## 2025-02-07 NOTE — ACP (ADVANCE CARE PLANNING)
Confirming Previous Code Status:   Advance Care Planning Note     Discussion Date: 02/07/25   Discussion Participants: patient    The patient wishes to discuss Advance Care Planning today and the following is a brief summary of our discussion.     Patient has capacity to make their own medical decisions: Yes  Health Care Agent/Surrogate Decision Maker documented in chart: Yes    Documents on file and valid:  Advance Directive/Living Will: Yes   Health Care Power of : Yes  Other: none    Communication of Medical Status/Prognosis:   yes     Communication of Treatment Goals/Options:   yes     Treatment Decisions  Goals of Care: survival is paramount regardless of prognosis, treatment outcome, or burden   yes  Follow Up Plan  no  Team Members  myself  Time Statement: Total face to face time spent on advance care planning was 16 minutes with 16 minutes spent in counseling, including the explanation.    Donal Kurtz DO  2/7/2025 10:49 AM

## 2025-02-07 NOTE — PROGRESS NOTES
"Subjective   Patient ID: Shaheen Washington is a 74 y.o. male who presents for Annual Exam (Annual  medicare wellness fasting bw).    HPI     Review of Systems   Constitutional: Negative.    HENT:  Positive for congestion, postnasal drip and rhinorrhea.    Respiratory:  Positive for cough.    Cardiovascular: Negative.         Dr Cottrell   Gastrointestinal: Negative.    Endocrine: Negative.    Genitourinary: Negative.         Dr Jamil   Musculoskeletal:  Positive for back pain.   Psychiatric/Behavioral: Negative.         Objective   /76 (BP Location: Left arm)   Pulse 72   Temp 36.2 °C (97.2 °F) (Temporal)   Ht 1.702 m (5' 7\")   Wt 86.7 kg (191 lb 3.2 oz)   SpO2 97%   BMI 29.95 kg/m²     Physical Exam  Vitals and nursing note reviewed.   Constitutional:       Appearance: Normal appearance.   HENT:      Right Ear: Tympanic membrane normal.      Left Ear: Tympanic membrane normal.      Mouth/Throat:      Pharynx: Oropharynx is clear.   Cardiovascular:      Rate and Rhythm: Normal rate and regular rhythm.      Pulses: Normal pulses.      Heart sounds: Normal heart sounds.   Pulmonary:      Breath sounds: Normal breath sounds.   Abdominal:      Palpations: Abdomen is soft.   Musculoskeletal:         General: Normal range of motion.   Neurological:      General: No focal deficit present.      Mental Status: He is alert and oriented to person, place, and time.   Psychiatric:         Mood and Affect: Mood normal.         Behavior: Behavior normal.         Assessment/Plan patient seen here for an annual Medicare wellness exam.  We reviewed his questionnaire he is agreeable to his responses.  We did discuss advanced directives.  He has no significant difficulty with depression or anxiety.  We are drawing his lab work here today he has upcoming appointment with otolaryngology for his sinuses.  Will see him back in a year  Problem List Items Addressed This Visit             ICD-10-CM    Pure hypercholesterolemia E78.00 "    Lumbar foraminal stenosis M48.061    GERD (gastroesophageal reflux disease) K21.9    Malignant neoplasm of prostate (Multi) C61    Hypertension I10    Relevant Orders    CBC and Auto Differential    Comprehensive Metabolic Panel     Other Visit Diagnoses         Codes    Medicare annual wellness visit, subsequent    -  Primary Z00.00    Chronic sinusitis, unspecified location     J32.9    BMI 29.0-29.9,adult     Z68.29

## 2025-02-08 LAB
ALBUMIN SERPL-MCNC: 4 G/DL (ref 3.6–5.1)
ALP SERPL-CCNC: 54 U/L (ref 35–144)
ALT SERPL-CCNC: 20 U/L (ref 9–46)
ANION GAP SERPL CALCULATED.4IONS-SCNC: 9 MMOL/L (CALC) (ref 7–17)
AST SERPL-CCNC: 23 U/L (ref 10–35)
BASOPHILS # BLD AUTO: 53 CELLS/UL (ref 0–200)
BASOPHILS NFR BLD AUTO: 0.9 %
BILIRUB SERPL-MCNC: 1.1 MG/DL (ref 0.2–1.2)
BUN SERPL-MCNC: 12 MG/DL (ref 7–25)
CALCIUM SERPL-MCNC: 9.2 MG/DL (ref 8.6–10.3)
CHLORIDE SERPL-SCNC: 104 MMOL/L (ref 98–110)
CO2 SERPL-SCNC: 27 MMOL/L (ref 20–32)
CREAT SERPL-MCNC: 0.93 MG/DL (ref 0.7–1.28)
EGFRCR SERPLBLD CKD-EPI 2021: 87 ML/MIN/1.73M2
EOSINOPHIL # BLD AUTO: 384 CELLS/UL (ref 15–500)
EOSINOPHIL NFR BLD AUTO: 6.5 %
ERYTHROCYTE [DISTWIDTH] IN BLOOD BY AUTOMATED COUNT: 12.2 % (ref 11–15)
GLUCOSE SERPL-MCNC: 89 MG/DL (ref 65–99)
HCT VFR BLD AUTO: 46.8 % (ref 38.5–50)
HGB BLD-MCNC: 15.7 G/DL (ref 13.2–17.1)
LYMPHOCYTES # BLD AUTO: 1221 CELLS/UL (ref 850–3900)
LYMPHOCYTES NFR BLD AUTO: 20.7 %
MCH RBC QN AUTO: 30.3 PG (ref 27–33)
MCHC RBC AUTO-ENTMCNC: 33.5 G/DL (ref 32–36)
MCV RBC AUTO: 90.3 FL (ref 80–100)
MONOCYTES # BLD AUTO: 614 CELLS/UL (ref 200–950)
MONOCYTES NFR BLD AUTO: 10.4 %
NEUTROPHILS # BLD AUTO: 3629 CELLS/UL (ref 1500–7800)
NEUTROPHILS NFR BLD AUTO: 61.5 %
PLATELET # BLD AUTO: 157 THOUSAND/UL (ref 140–400)
PMV BLD REES-ECKER: 10.3 FL (ref 7.5–12.5)
POTASSIUM SERPL-SCNC: 4.6 MMOL/L (ref 3.5–5.3)
PROT SERPL-MCNC: 6.3 G/DL (ref 6.1–8.1)
RBC # BLD AUTO: 5.18 MILLION/UL (ref 4.2–5.8)
SODIUM SERPL-SCNC: 140 MMOL/L (ref 135–146)
WBC # BLD AUTO: 5.9 THOUSAND/UL (ref 3.8–10.8)

## 2025-02-10 ENCOUNTER — APPOINTMENT (OUTPATIENT)
Dept: CARDIOLOGY | Facility: CLINIC | Age: 75
End: 2025-02-10
Payer: MEDICARE

## 2025-02-10 ENCOUNTER — OFFICE VISIT (OUTPATIENT)
Dept: OTOLARYNGOLOGY | Facility: CLINIC | Age: 75
End: 2025-02-10
Payer: MEDICARE

## 2025-02-10 VITALS
SYSTOLIC BLOOD PRESSURE: 134 MMHG | OXYGEN SATURATION: 97 % | WEIGHT: 191 LBS | HEIGHT: 67 IN | HEART RATE: 67 BPM | BODY MASS INDEX: 29.98 KG/M2 | DIASTOLIC BLOOD PRESSURE: 74 MMHG

## 2025-02-10 VITALS — WEIGHT: 191.6 LBS | HEIGHT: 67 IN | BODY MASS INDEX: 30.07 KG/M2

## 2025-02-10 DIAGNOSIS — J34.89 NASAL DRAINAGE: ICD-10-CM

## 2025-02-10 DIAGNOSIS — J33.9 NASAL POLYP: ICD-10-CM

## 2025-02-10 DIAGNOSIS — R06.02 SHORTNESS OF BREATH: ICD-10-CM

## 2025-02-10 DIAGNOSIS — R00.1 BRADYCARDIA: ICD-10-CM

## 2025-02-10 DIAGNOSIS — J34.2 DEVIATED NASAL SEPTUM: Primary | ICD-10-CM

## 2025-02-10 DIAGNOSIS — R05.3 CHRONIC COUGH: ICD-10-CM

## 2025-02-10 DIAGNOSIS — E78.00 PURE HYPERCHOLESTEROLEMIA: ICD-10-CM

## 2025-02-10 DIAGNOSIS — R09.82 POST-NASAL DRAINAGE: ICD-10-CM

## 2025-02-10 DIAGNOSIS — I25.10 CORONARY ARTERY DISEASE INVOLVING NATIVE CORONARY ARTERY OF NATIVE HEART, UNSPECIFIED WHETHER ANGINA PRESENT: Primary | ICD-10-CM

## 2025-02-10 PROCEDURE — 3008F BODY MASS INDEX DOCD: CPT | Performed by: OTOLARYNGOLOGY

## 2025-02-10 PROCEDURE — 99214 OFFICE O/P EST MOD 30 MIN: CPT | Performed by: INTERNAL MEDICINE

## 2025-02-10 PROCEDURE — 1160F RVW MEDS BY RX/DR IN RCRD: CPT | Performed by: OTOLARYNGOLOGY

## 2025-02-10 PROCEDURE — 99204 OFFICE O/P NEW MOD 45 MIN: CPT | Performed by: OTOLARYNGOLOGY

## 2025-02-10 PROCEDURE — 3008F BODY MASS INDEX DOCD: CPT | Performed by: INTERNAL MEDICINE

## 2025-02-10 PROCEDURE — 3075F SYST BP GE 130 - 139MM HG: CPT | Performed by: INTERNAL MEDICINE

## 2025-02-10 PROCEDURE — 99214 OFFICE O/P EST MOD 30 MIN: CPT | Mod: 25 | Performed by: INTERNAL MEDICINE

## 2025-02-10 PROCEDURE — 31231 NASAL ENDOSCOPY DX: CPT | Performed by: OTOLARYNGOLOGY

## 2025-02-10 PROCEDURE — 1159F MED LIST DOCD IN RCRD: CPT | Performed by: INTERNAL MEDICINE

## 2025-02-10 PROCEDURE — 93005 ELECTROCARDIOGRAM TRACING: CPT | Performed by: INTERNAL MEDICINE

## 2025-02-10 PROCEDURE — G2211 COMPLEX E/M VISIT ADD ON: HCPCS | Performed by: INTERNAL MEDICINE

## 2025-02-10 PROCEDURE — 93010 ELECTROCARDIOGRAM REPORT: CPT | Performed by: INTERNAL MEDICINE

## 2025-02-10 PROCEDURE — 3078F DIAST BP <80 MM HG: CPT | Performed by: INTERNAL MEDICINE

## 2025-02-10 PROCEDURE — 1159F MED LIST DOCD IN RCRD: CPT | Performed by: OTOLARYNGOLOGY

## 2025-02-10 PROCEDURE — 1036F TOBACCO NON-USER: CPT | Performed by: INTERNAL MEDICINE

## 2025-02-10 RX ORDER — AZELASTINE 1 MG/ML
2 SPRAY, METERED NASAL 2 TIMES DAILY
Qty: 30 ML | Refills: 11 | Status: SHIPPED | OUTPATIENT
Start: 2025-02-10 | End: 2026-02-10

## 2025-02-10 RX ORDER — IPRATROPIUM BROMIDE 21 UG/1
2 SPRAY, METERED NASAL 3 TIMES DAILY
Qty: 30 ML | Refills: 11 | Status: SHIPPED | OUTPATIENT
Start: 2025-02-10 | End: 2026-02-10

## 2025-02-10 ASSESSMENT — PATIENT HEALTH QUESTIONNAIRE - PHQ9
SUM OF ALL RESPONSES TO PHQ9 QUESTIONS 1 AND 2: 0
2. FEELING DOWN, DEPRESSED OR HOPELESS: NOT AT ALL
1. LITTLE INTEREST OR PLEASURE IN DOING THINGS: NOT AT ALL

## 2025-02-10 NOTE — PROGRESS NOTES
"Chief Complaint:   Follow-up (6 months follow up)     History Of Present Illness:    Shaheen Washington is a 74 y.o. male presenting with testing F/U  Occasional SOB/cough  Patient denies chest pain/palpitations/dizziness/lightheadedness/edema/claudication    Active-cardio 3 times per week/light weights         Last Recorded Vitals:  Vitals:    02/10/25 1353   BP: 134/74   BP Location: Right arm   Pulse: 67   SpO2: 97%   Weight: 86.6 kg (191 lb)   Height: 1.702 m (5' 7\")            Allergies:  Penicillins    Outpatient Medications:  Current Outpatient Medications   Medication Instructions    aspirin 81 mg EC tablet 1 tablet, Daily    atorvastatin (LIPITOR) 80 mg, oral, Daily    cyanocobalamin (Vitamin B-12) 1,000 mcg tablet 1 tablet, Every other day    ergocalciferol (Vitamin D-2) 1.25 MG (86063 UT) capsule 2 tablets, Daily    ezetimibe (ZETIA) 10 mg, oral, Daily    famotidine (Pepcid) 40 mg tablet 1 tablet, 2 times daily    fluticasone (Flonase) 50 mcg/actuation nasal spray 1 spray, Each Nostril, Daily    fluticasone (Flonase) 50 mcg/actuation nasal spray 1 spray, Each Nostril, Daily    isosorbide mononitrate ER (IMDUR) 30 mg, oral, Daily    magnesium oxide 500 mg, Daily    methocarbamol (ROBAXIN) 500 mg, oral, 4 times daily PRN    nitroglycerin (NITROSTAT) 0.4 mg, Every 5 min PRN    omeprazole OTC (PRILOSEC OTC) 20 mg, Daily before breakfast    tamsulosin (Flomax) 0.4 mg 24 hr capsule     ubidecarenone (COENZYME Q10, BULK, MISC) 1 capsule, Daily       Physical Exam:  Constitutional:       Appearance: Healthy appearance. Not in distress.   Neck:      Vascular: No JVR. JVD normal.   Pulmonary:      Effort: Pulmonary effort is normal.      Breath sounds: Normal breath sounds. No wheezing. No rhonchi. No rales.   Chest:      Chest wall: Not tender to palpatation.   Cardiovascular:      PMI at left midclavicular line. Normal rate. Regular rhythm. Normal S1. Normal S2.       Murmurs: There is no murmur.      No gallop.  No " click. No rub.   Pulses:     Intact distal pulses.   Edema:     Peripheral edema absent.   Abdominal:      General: Bowel sounds are normal.      Palpations: Abdomen is soft.      Tenderness: There is no abdominal tenderness.   Musculoskeletal: Normal range of motion.         General: No tenderness. Skin:     General: Skin is warm and dry.   Neurological:      General: No focal deficit present.      Mental Status: Alert and oriented to person, place and time.          Last Labs:  CBC -  Lab Results   Component Value Date    WBC 5.9 02/07/2025    HGB 15.7 02/07/2025    HCT 46.8 02/07/2025    MCV 90.3 02/07/2025     02/07/2025       CMP -  Lab Results   Component Value Date    CALCIUM 9.2 02/07/2025    PROT 6.3 02/07/2025    ALBUMIN 4.0 02/07/2025    AST 23 02/07/2025    ALT 20 02/07/2025    ALKPHOS 54 02/07/2025    BILITOT 1.1 02/07/2025       LIPID PANEL -   Lab Results   Component Value Date    CHOL 128 01/23/2025    TRIG 114 01/23/2025    HDL 48.9 01/23/2025    CHHDL 2.6 01/23/2025    LDLF 43 01/18/2023    VLDL 23 01/23/2025    NHDL 79 01/23/2025   Ldl=56    RENAL FUNCTION PANEL -   Lab Results   Component Value Date    GLUCOSE 89 02/07/2025     02/07/2025    K 4.6 02/07/2025     02/07/2025    CO2 27 02/07/2025    ANIONGAP 9 02/07/2025    BUN 12 02/07/2025    CREATININE 0.93 02/07/2025    GFRMALE 87 01/18/2023    CALCIUM 9.2 02/07/2025    ALBUMIN 4.0 02/07/2025        Lab Results   Component Value Date    BNP 52 10/02/2022           Lab review: I have personally reviewed the laboratory result(s)       Problem List Items Addressed This Visit       Bradycardia    Overview     Resolved after stopping beta blocker           Coronary artery disease - Primary    Overview     11/2013 STEMI: TARIQ x2 (LAD/ramus - occlusion of small PDA)  No definite angina/ischemic EKG changes  1/2024 stress test with good exercise tolerance / NL perfusion / NL LVEF   On ASA / statin / beta blocker.              Relevant  Orders    ECG 12 lead (Clinic Performed) (Completed)    Pure hypercholesterolemia    Overview     On HI statin   6/2021 lipids with LDL=91   LDL increased from 6/2020 @ 71   Discussed adding Zetia but declined   12/2021 LDL=73 thus started Zetia   1/2025 lipids excellent         Shortness of breath    Overview     Resolved             Current Assessment & Plan     Associated with cough  Some fibrosis on 1/2025 chest CT  Seeing ENT  Mote -no CHF on CT or exam          Stay active    Kris Cottrell, DO

## 2025-02-10 NOTE — PROGRESS NOTES
Sinus & Skull Base Surgery    Chief Complaint:  Chronic cough    History Of Present Illness:  Reason For Visit:   Shaheen Washington was referred to me by Dr. Donal Krutz for evaluation of chronic cough.    chronic cough for years in the AM producing mucous  Doesn't remember an inciting event.  He has COVID 3 times.  First time he was very sick and got PNA and was given the monoclonal antibodies.      Also has nasal obstruction    Has not seen pulm yet.   Some SOB with exertion seems to be worsening over the last 12 months or so. He had a chest CT scan which showed some fibrosis of the lungs, but states that his doctor was not overly concerned about the findings.    Main Symptoms:  Patient does not have anterior nasal drainage.     Patient has posterior nasal drainage.  Seems to be related to cough.    Patient has nasal airway obstruction.  Here and there.   Patient does not have facial pain.    Patient does not have facial pressure.    Patient does not have decreased sense of smell.   Associated Symptoms:   Patient does not have headaches.    Patient has throat clearing.    Patient has coughing.    Patient has dysphonia.  Some minor recently over the last few months.  Patient does not have nasal bleeding.    Medications currently on for sinonasal symptoms:  Omeprazole 20mg having some benefit on for 3 weeks  Flonase 2 puffs each side in the AM  Cetirizine Hydrochloride Qday    Medications tried in the past for sinonasal symptoms:  Z-Mor x2  Doxycycline    Other Pertinent Medical Conditions:   Patient does not have asthma.    Patient does not have aspirin sensitivity.    Patient does not have migraines.    Patient does not have history of allergy testing.   Patient does not have history of sinus surgery.    Patient does not have history of nasal fracture.    Patient has heartburn.    The patient does take medical therapy for heartburn. Omeprazole 20mg for the past 3 weeks.  The patient has a GI  evaluation.  Has had upper GI scope.  Has been dilated a few times.  No current dysphagia.    The patient has imaging of sinuses. When: Jan 2025.    Active Problems:  Patient Active Problem List   Diagnosis    Bradycardia    Chest pain, atypical    Colon polyps    Coronary artery disease    Fatigue    Fever    Herpes zoster    History of vitamin D deficiency    Lesion of buccal mucosa    Overweight    Palpitations    Pure hypercholesterolemia    Anorectal polyp    Arthritis    Contusion of finger of left hand    Dysphagia    Effusion of joint of left hand    History of adenomatous polyp of colon    Irregular heart rhythm    Kidney stones    Left hand pain    Lumbar disc herniation    Lumbar foraminal stenosis    Lumbar neuritis    Lumbosacral stenosis    Osteopenia    GERD (gastroesophageal reflux disease)    SOB (shortness of breath)    Malignant neoplasm of prostate (Multi)    Laceration of left thumb    Hypertension    External hemorrhoids    Status post total hip replacement, right    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    Surgical History:  He has a past surgical history that includes Other surgical history (12/13/2019); Other surgical history (12/13/2019); Other surgical history (12/13/2019); Other surgical history (10/04/2021); Other surgical history (04/24/2020); Other surgical history (04/24/2020); Other surgical history (06/16/2022); and Other surgical history (06/16/2022).     Family History:  Family History   Problem Relation Name Age of Onset    No Known Problems Father       Social History:  He reports that he has quit smoking. His smoking use included cigarettes. He has never been exposed to tobacco smoke. He has never used smokeless tobacco. He reports current alcohol use. He reports that he does not use drugs.     Allergies:  Penicillins    Current Meds:  Current Outpatient Medications:     aspirin 81  mg EC tablet, Take 1 tablet (81 mg) by mouth once daily., Disp: , Rfl:     atorvastatin (Lipitor) 80 mg tablet, Take 1 tablet by mouth once daily, Disp: 90 tablet, Rfl: 3    cyanocobalamin (Vitamin B-12) 1,000 mcg tablet, Take 1 tablet (1,000 mcg) by mouth every other day., Disp: , Rfl:     ergocalciferol (Vitamin D-2) 1.25 MG (40088 UT) capsule, Take 2 tablets by mouth once daily., Disp: , Rfl:     ezetimibe (Zetia) 10 mg tablet, Take 1 tablet (10 mg) by mouth once daily., Disp: 90 tablet, Rfl: 1    famotidine (Pepcid) 40 mg tablet, Take 1 tablet (40 mg) by mouth 2 times a day., Disp: , Rfl:     fluticasone (Flonase) 50 mcg/actuation nasal spray, Administer 1 spray into each nostril once daily., Disp: 16 g, Rfl: 0    fluticasone (Flonase) 50 mcg/actuation nasal spray, Use 1 spray(s) in each nostril once daily, Disp: 16 g, Rfl: 1    isosorbide mononitrate ER (Imdur) 30 mg 24 hr tablet, Take 1 tablet by mouth once daily, Disp: 90 tablet, Rfl: 3    magnesium oxide 500 mg capsule, Take 1 capsule (500 mg) by mouth once daily., Disp: , Rfl:     nitroglycerin (Nitrostat) 0.4 mg SL tablet, Place 1 tablet (0.4 mg) under the tongue every 5 minutes if needed for chest pain., Disp: , Rfl:     omeprazole OTC (PriLOSEC OTC) 20 mg EC tablet, Take 1 tablet (20 mg) by mouth once daily in the morning. Take before meals. Do not crush, chew, or split., Disp: , Rfl:     tamsulosin (Flomax) 0.4 mg 24 hr capsule, , Disp: , Rfl:     ubidecarenone (COENZYME Q10, BULK, MISC), Take 1 capsule by mouth once daily., Disp: , Rfl:     methocarbamol (Robaxin) 500 mg tablet, Take 1 tablet (500 mg) by mouth 4 times a day as needed for muscle spasms., Disp: 40 tablet, Rfl: 3    Current Facility-Administered Medications:     Tc-99m tetrofosmin (Myoview) injection 11.3 millicurie, 11.3 millicurie, intravenous, Once in imaging, Edie Cevallos MD    Tc-99m tetrofosmin (Myoview) injection 34.3 millicurie, 34.3 millicurie, intravenous, Once in imaging, Edie  "MD Mart    Vitals:  Visit Vitals  Ht 1.702 m (5' 7\")   Wt 86.9 kg (191 lb 9.6 oz)   BMI 30.01 kg/m²   Smoking Status Former   BSA 2.03 m²     Physical Exam:  CONSTITUTIONAL:  Vitals reviewed in nursing chart, well developed, well nourished.    RESPIRATION:  Breathing comfortably, no stridor.  CV:  No clubbing/cyanosis/edema in hands.  EYES:  EOM Intact, sclera normal.  NEURO:  Alert and oriented times 3, Cranial nerves 2-12 intact and symmetric bilaterally.  HEAD AND FACE:  Skin with no masses or lesions, sinuses nontender to palpation.  SALIVARY GLANDS:  Parotid and submandibular glands normal bilaterally.  EARS:  Normal external ears, external auditory canals, and TMs to otoscopy, normal hearing to whispered voice.  NOSE:  External nose midline, anterior rhinoscopy is normal with limited visualization to the anterior aspect of the interior turbinates (see nasal endoscopy).  ORAL CAVITY/OROPHARYNX/LIPS:  Normal mucous membranes, normal floor of mouth/tongue/OP, no masses or lesions are noted.  PHARYNGEAL WALLS AND NASOPHARYNX:  No masses noted.  NECK/LYMPH:  No LAD, no thyroid masses.  LARYNX:  No lesions noted.  Normal vocal fold mobility bilateral. Normal larynx ***YOU DID EVALUATE THIS.    SINONASAL ENDOSCOPY (CPT 37589): To better evaluate the patient's symptoms, sinonasal endoscopy is indicated.  After discussion of risks and benefits, and topical decongestion and anesthesia, an endoscope was used to perform nasal endoscopy on each side.  A time out identifying the patient, the procedure, the location of the procedure and any concerns was performed prior to beginning the procedure.    Findings:  Examination of each nasal cavity revealed normal middle meatuses bilaterally. There was a small amount of polypoid tissue bilaterally at each sphenoethmoid recess that was associated with each superior turbinate. The septum was deviated to the left.    Results/Data:  I personally reviewed the note from Dr. Mansfield" Sustersic dated 2/7/2025. In his note, the review of systems was positive for congestion, postnasal drip, rhinorrhea, and cough. He referred the patient to rhinology for evaluation of the sinuses.    I personally reviewed the CT chest dated 1/9/2025 with the patient today in clinic.    IMPRESSION:  1.  Multifocal irregular peripheral interstitial thickening and tiny bulla which may represent mild fibrosis. No additional localized consolidation.    I personally reviewed the CT sinus dated 10/2/2009 with the patient today in clinic. It showed some inflammation of his sinuses.    Provider Impressions:  ***  Posterior nasal drainage  Nasal airway obstruction  Throat clearing, coughing, dysphonia    Discussion: Shaheen Washington     EMAIL PCP IF HE THINKS A PULMONARY WORKUP WOULD BE INDICATED. The patient reported dyspnea on exertion that has been worsening over the past year. He had a chest CT scan which showed some fibrosis of the lungs.    Medication started today: azelastine, ipratropium (continue Flonase)  We reviewed appropriate administration technique of aiming away from the septum. We also reviewed dosing parameters for the medical therapies. I recommended the patient begin with azelastine to be used consistently over the next 1 month. If at 1 month, there is clear benefit, I recommended continuation. If there is not, I recommended discontinuation of azelastine. At that time, I recommended initiation of ipratropium. If there is clear benefit, I recommended continuation. If there is not, I recommended discontinuation of ipratropium. We discussed the rationale for staggering the start for these therapies. I asked him to continue using Flonase throughout these nasal spray trials. There are no interactions between these medical therapies and they can be used concurrently if benefit is derived with the initial medication. I recommended discontinuation of any of the nasal sprays for any side effects.    Follow up with me in  2 months. I asked him to send our office a MyChart message or call our office if he has any concerns.    All questions were answered.    Patient Discussion/Summary:  Welcome to Dr. Martinez Vargas's clinic.  He is an ENT physician that specializes in nose, sinus, and skull base disorders.    Dr. Martinez Vargas's office number is [phone].  Please call this number to contact his care team regardless of which office you use to access care.  This number is the most direct way to communicate with all the members of the care team.    His care team includes:    :  Елена Parmar  Available to receive calls Monday through Friday from 8:00 am until 4:25 pm  She can help you with scheduling of appointments and any general, non-medical questions about the practice    Primary nurse:  Cleopatra Cody RN, BSN  Available to receive calls Tuesday through Friday from 8:00 am until 4:25 pm  Cleopatra is also Dr. Vargas's surgery scheduler and will assist you with planning and scheduling of your surgery during her office hours    Vilma Hamlin RN, BSN  Available to receive calls Monday through Thursday from 8:00 am until 4:25 pm    Rhinology Nurse Practitioner:  Vilma Ojeda CNP (sees patients in [place], [facility], and [facility])  She works collaboratively with Dr. Vargas.  If a more urgent appointment is needed she is a wonderful resource.    Louie Sigala MD (sees patients in [place] and [facility])  Danielle Milligan MD (sees patients in [facility], [facility], and [place])  Dr. Vargas's partners in the division of Rhinology    For your convenience, Dr. Vargas sees patients at [facility] and [facility].  While we try to make your appointments as convenient as possible, occasionally a visit to another location may be necessary to provide the best care for you.    We look forward to working with you to meet your healthcare goals.    Scribe Attestation  By signing my name below, I,  Renny Keller, attest that this documentation has been prepared under the direction and in the presence of Martinez Vargas MD.    Signature:  Martinez Vargas MD

## 2025-02-14 LAB
ATRIAL RATE: 71 BPM
P AXIS: 61 DEGREES
P OFFSET: 160 MS
P ONSET: 104 MS
PR INTERVAL: 218 MS
Q ONSET: 213 MS
QRS COUNT: 12 BEATS
QRS DURATION: 90 MS
QT INTERVAL: 400 MS
QTC CALCULATION(BAZETT): 434 MS
QTC FREDERICIA: 423 MS
R AXIS: 6 DEGREES
T AXIS: 46 DEGREES
T OFFSET: 413 MS
VENTRICULAR RATE: 71 BPM

## 2025-02-20 ENCOUNTER — APPOINTMENT (OUTPATIENT)
Dept: PAIN MEDICINE | Facility: CLINIC | Age: 75
End: 2025-02-20
Payer: MEDICARE

## 2025-02-25 ENCOUNTER — TELEPHONE (OUTPATIENT)
Dept: OTOLARYNGOLOGY | Facility: CLINIC | Age: 75
End: 2025-02-25
Payer: MEDICARE

## 2025-02-25 DIAGNOSIS — R05.3 CHRONIC COUGH: ICD-10-CM

## 2025-02-25 NOTE — TELEPHONE ENCOUNTER
Per Dr. Vargas, patient notified that a discussion was had between Dr. Vargas and Dr. Kurtz regarding the patients chronic cough. It is recommended for the patient to have a pulmonary evaluation for assessment of this. Patient notified that referral has been placed to Dr. Thomas and patient provided with scheduling phone number. Ideally, Dr. Vargas would like to see the patient back in our office after he has an assessment with Dr. Thomas. Patient verbalizes understanding with plan of care.

## 2025-02-27 ENCOUNTER — APPOINTMENT (OUTPATIENT)
Dept: CARDIOLOGY | Facility: CLINIC | Age: 75
End: 2025-02-27
Payer: MEDICARE

## 2025-02-27 ENCOUNTER — OFFICE VISIT (OUTPATIENT)
Dept: PAIN MEDICINE | Facility: CLINIC | Age: 75
End: 2025-02-27
Payer: MEDICARE

## 2025-02-27 VITALS
HEART RATE: 76 BPM | DIASTOLIC BLOOD PRESSURE: 91 MMHG | OXYGEN SATURATION: 96 % | BODY MASS INDEX: 29.98 KG/M2 | TEMPERATURE: 95.9 F | HEIGHT: 67 IN | SYSTOLIC BLOOD PRESSURE: 147 MMHG | RESPIRATION RATE: 15 BRPM | WEIGHT: 191 LBS

## 2025-02-27 DIAGNOSIS — M54.16 LUMBAR NEURITIS: ICD-10-CM

## 2025-02-27 DIAGNOSIS — M48.07 LUMBOSACRAL STENOSIS: ICD-10-CM

## 2025-02-27 DIAGNOSIS — M48.061 LUMBAR FORAMINAL STENOSIS: ICD-10-CM

## 2025-02-27 DIAGNOSIS — M51.26 LUMBAR DISC HERNIATION: Primary | ICD-10-CM

## 2025-02-27 PROCEDURE — 99215 OFFICE O/P EST HI 40 MIN: CPT | Performed by: ANESTHESIOLOGY

## 2025-02-27 ASSESSMENT — COLUMBIA-SUICIDE SEVERITY RATING SCALE - C-SSRS
6. HAVE YOU EVER DONE ANYTHING, STARTED TO DO ANYTHING, OR PREPARED TO DO ANYTHING TO END YOUR LIFE?: NO
2. HAVE YOU ACTUALLY HAD ANY THOUGHTS OF KILLING YOURSELF?: NO
1. IN THE PAST MONTH, HAVE YOU WISHED YOU WERE DEAD OR WISHED YOU COULD GO TO SLEEP AND NOT WAKE UP?: NO

## 2025-02-27 ASSESSMENT — ENCOUNTER SYMPTOMS
OCCASIONAL FEELINGS OF UNSTEADINESS: 0
LOSS OF SENSATION IN FEET: 0

## 2025-02-27 ASSESSMENT — PAIN SCALES - GENERAL
PAINLEVEL_OUTOF10: 7
PAINLEVEL_OUTOF10: 7

## 2025-02-27 ASSESSMENT — PAIN - FUNCTIONAL ASSESSMENT: PAIN_FUNCTIONAL_ASSESSMENT: 0-10

## 2025-02-27 ASSESSMENT — PAIN DESCRIPTION - DESCRIPTORS: DESCRIPTORS: ACHING;SHARP;BURNING

## 2025-02-27 NOTE — PROGRESS NOTES
History Of Present Illness  Shaheen Washington is a 74 y.o. male presenting with No chief complaint on file.    [facility] [place]     Patient presents with complaints of chronic low back pain to the right buttock area. The pain is constant, worse with activity and better with rest. The pain is sharp, stabbing and shooting to the RLE and LLE but worse on the RIGHT. Denies LE paresthesias, weakness, saddle anesthesia, bowel or bladder incontinence. To manage this pain the patient has attempted Advil PRN which is effective for him. The patient has undergone nerve blocks at [facility] with Dr. Bob approx 12 years ago. The patients chronic, pulmonary fibrosis, COPD, DLD, GERD, BPH  are stable on medication management. Hx of prostate CA treated with XRT in 2023. Hx of MI 15 years ago.     PAIN SCORE: 7/10.    PCP: Dr. Kurtz        Past Medical History  He has a past medical history of Prostate cancer (Multi).    Surgical History  He has a past surgical history that includes Other surgical history (12/13/2019); Other surgical history (12/13/2019); Other surgical history (12/13/2019); Other surgical history (10/04/2021); Other surgical history (04/24/2020); Other surgical history (04/24/2020); Other surgical history (06/16/2022); and Other surgical history (06/16/2022).     Social History  He reports that he has quit smoking. His smoking use included cigarettes. He has never been exposed to tobacco smoke. He has never used smokeless tobacco. He reports current alcohol use. He reports that he does not use drugs.    Family History  Family History   Problem Relation Name Age of Onset    No Known Problems Father          Allergies  Penicillins    Review of Systems    All other systems reviewed and negative for any deficits. Pertinent positives and negatives were considered in the medical decision making process.        Physical Exam  BP (!) 147/91   Pulse 76   Temp 35.5 °C (95.9 °F)   Resp 15   Wt 86.6 kg (191 lb)   SpO2 96%     General: Pt  appears stated age    Eyes: Conjunctiva non-icteric and lids without obvious rash or drooping. Pupils are symmetric.    ENT: External ears and nose appear to be without deformity or rash. No lesions or masses noted. Hearing is grossly intact.    Respiratory: No gasping or shortness of breath noted. No use of accessory muscles noted.    CVS: Extremities show no edema or varicosities    Skin: No rashes or open lesions/ulcers identified on skin. No induration/tightening noted with palpation of skin.     Musculoskeletal: [place] is grossly normal.    Stability: No subluxation noted on movement of bilateral upper extremities or head/neck.     Strength: 5/5 in RLE and 5/5 in LLE     Range of Motion: WNL    Neurologic: Reflexes 2+    Sensation: WNL    Neurologic: Cranial Nerves II thru XII are grossly intact    Psychiatric: Pt is alert and oriented to time, person, and place           Assessment/Plan   1. Lumbar disc herniation        2. Lumbar foraminal stenosis        3. Lumbar neuritis        4. Lumbosacral stenosis               I have provided the patient with a list of physical therapy exercises to learn and perform to strengthen core, maintain stabilization, and reduce pain. We reviewed the exercises in detail and I encouraged them to perform them on a regular basis.  2. I would recommend the pt start on Gabapentin to help with nerve related pain. We discussed the risks, benefits, and side effects to this medication including the mechanism of action and the pt understands and will hold for now.   I extensively reviewed the patients MRI findings (2023 [facility]) in detail, including review of the actual images and provided a detailed explanation of the findings using a spine model. There is a large disc herniation at the L4/5 level with smaller bulging discs at the L3/4 and L5/S1 levels. There is also an anterolisthesis L4 on L5. There is a severe right sided foraminal stenosis at the L4/5 level.     [facility] [phone]    The  patient is a candidate for an LESI L5/S1 to treat low back / neck and radicular pain. I spent time with the patient discussing the risks, benefits, and alternatives to this measure including, but not limited to worsening pain with injection, no improvement/guarantee with the procedure, numbness in the lower extremity and risk of falls post procedure, vagal reaction/ hypotension, feeling dizzy / lightheaded, spinal infection, worsening pre-existing infections, epidural hematoma, epidural abscess, nerve injury, paralysis, spinal fluid leak and spinal headache. The patient understands all of these risks and agrees to proceed with the planned procedure.   We will contact the patients PCP to determine if it is safe to stop ASA 7 days prior to procedure. If Pt is to be bridged on Lovenox they will need to be off of Lovenox for 24 hours prior to the procedure. We did discuss the risks for stopping anticoagulation including, but not limited to any cardiovascular event, embolism, and even death. The patient understands these risks and would like to proceed with the procedure.      I spent time with the patient reviewing their imaging and discussing the risks benefits and alternatives to the above plan. A total of 45 minutes was spent reviewing the data and greater than 50% of that time was with the patient during the face to face encounter discussing treatment options both surgical, non-surgical, and minimally invasive techniques.         Sincerely,     Stan Aguilar MD, ARIELA  Board Certified Anesthesiologist  Board Certified Pain Management Specialist  Clinical Faculty, Department of Anesthesiology and Perioperative Medicine  [employer] [employer]     [facility]  [address] [place] [address].  [employer] [facility] [address] [id number]     [facility]   [address] [id number]    [facility]  [address] [facility] [place] [address]     Phone: [phone]  Fax: [phone]

## 2025-02-27 NOTE — PROGRESS NOTES
Low back pain to the right side to the buttox.  History of left knee pain.  Denies back and neck surgery

## 2025-03-05 ENCOUNTER — OFFICE VISIT (OUTPATIENT)
Dept: PULMONOLOGY | Facility: CLINIC | Age: 75
End: 2025-03-05
Payer: MEDICARE

## 2025-03-05 VITALS
WEIGHT: 192 LBS | SYSTOLIC BLOOD PRESSURE: 135 MMHG | OXYGEN SATURATION: 97 % | HEART RATE: 75 BPM | TEMPERATURE: 97 F | DIASTOLIC BLOOD PRESSURE: 85 MMHG | RESPIRATION RATE: 18 BRPM | BODY MASS INDEX: 30.07 KG/M2

## 2025-03-05 DIAGNOSIS — J84.9 ILD (INTERSTITIAL LUNG DISEASE) (MULTI): ICD-10-CM

## 2025-03-05 DIAGNOSIS — R06.09 DOE (DYSPNEA ON EXERTION): Primary | ICD-10-CM

## 2025-03-05 DIAGNOSIS — R05.3 CHRONIC COUGH: ICD-10-CM

## 2025-03-05 DIAGNOSIS — J45.991 COUGH VARIANT ASTHMA (HHS-HCC): ICD-10-CM

## 2025-03-05 PROCEDURE — 1036F TOBACCO NON-USER: CPT | Performed by: INTERNAL MEDICINE

## 2025-03-05 PROCEDURE — 99215 OFFICE O/P EST HI 40 MIN: CPT | Performed by: INTERNAL MEDICINE

## 2025-03-05 PROCEDURE — 3075F SYST BP GE 130 - 139MM HG: CPT | Performed by: INTERNAL MEDICINE

## 2025-03-05 PROCEDURE — 99205 OFFICE O/P NEW HI 60 MIN: CPT | Performed by: INTERNAL MEDICINE

## 2025-03-05 PROCEDURE — 1159F MED LIST DOCD IN RCRD: CPT | Performed by: INTERNAL MEDICINE

## 2025-03-05 PROCEDURE — 1160F RVW MEDS BY RX/DR IN RCRD: CPT | Performed by: INTERNAL MEDICINE

## 2025-03-05 PROCEDURE — 3079F DIAST BP 80-89 MM HG: CPT | Performed by: INTERNAL MEDICINE

## 2025-03-05 RX ORDER — ALBUTEROL SULFATE 90 UG/1
1 INHALANT RESPIRATORY (INHALATION) ONCE
OUTPATIENT
Start: 2025-03-05

## 2025-03-05 RX ORDER — ALBUTEROL SULFATE 0.83 MG/ML
3 SOLUTION RESPIRATORY (INHALATION) ONCE
OUTPATIENT
Start: 2025-03-05 | End: 2025-03-05

## 2025-03-05 ASSESSMENT — ENCOUNTER SYMPTOMS
RHINORRHEA: 0
AGITATION: 0
STRIDOR: 0
EYE REDNESS: 0
SHORTNESS OF BREATH: 1
JOINT SWELLING: 0
NERVOUS/ANXIOUS: 0
SPEECH DIFFICULTY: 0
ADENOPATHY: 0
CONSTIPATION: 0
FREQUENCY: 0
HEMATURIA: 0
EYE DISCHARGE: 0
FACIAL SWELLING: 0
PALPITATIONS: 0
BRUISES/BLEEDS EASILY: 0
FEVER: 0
NUMBNESS: 0
COUGH: 1
DYSURIA: 0
DIFFICULTY URINATING: 0
NAUSEA: 0
CHOKING: 0
SLEEP DISTURBANCE: 0
SINUS PRESSURE: 0
WHEEZING: 0
LIGHT-HEADEDNESS: 0
ABDOMINAL DISTENTION: 0
UNEXPECTED WEIGHT CHANGE: 0
APNEA: 0
ARTHRALGIAS: 0
SINUS PAIN: 0
ABDOMINAL PAIN: 0
FATIGUE: 0
TREMORS: 0
WEAKNESS: 0
DIZZINESS: 0
HEADACHES: 0

## 2025-03-05 NOTE — PROGRESS NOTES
@PULMONARY CONSULTATION@         Reason for Consult: coughing and POSEY; ILD    ASSESSMENT:   The patient a 74-year-old with underlying coronary disease status post myocardial infarction and stenting.  He also has a history of prostate cancer and is being treated for sinus congestion and postnasal drip.  He has been noted to have some eosinophilia with eosinophil count on February 8, 2025 being 384.  He has no evidence of pulmonary hypertension on echocardiogram from last year.  He had severe COVID in 2021 and was hospitalized at the interstitial changes noted on his recent CT scan I suspect related to his previous COVID infection.  He has noted other definite clinical indications of diffuse fibrotic lung disease from a systemic disorder.  The cough he has may be related to his sinus congestion or possibly asthma with having eosinophilia.  He also potentially could have reflux.  The interstitial changes are not extensive and appear in a distribution similar to the groundglass changes noted in 2021.  His lungs are clear on auscultation.    PLAN:   I am going to order complete pulmonary functions, fractional exhalation of nitric oxide and 6-minute walk test.  These breathing test can be scheduled by calling     In addition, I am going to order labs for interstitial lung disease as well as an allergy panel.    Once results become available I will discuss them with you.          HISTORY OF PRESENT ILLNESS:   The patient is a 74-year-old with a history of prostate cancer and was a former smoker.  He has hyperlipidemia and is noted to have chronic sinusitis with nasal polyposis.  He saw Dr. Vargas who noted significant sinus inflammation.  He was continued on fluticasone as well as ipratropium and azelastine.    The CT scan of the sinuses from January 9, 2025 revealed the following  1. Moderate diffuse paranasal sinus disease with obstructions in the  frontoethmoidal and sphenoethmoidal recesses as well as  the right  maxillary ostia. Minimal progression of disease since the comparison  exam.  2.Intracranial atherosclerotic vascular disease.      The chest CT scan from January 10, 2025 revealed the following  1.  Multifocal irregular peripheral interstitial thickening and tiny  bulla which may represent mild fibrosis. No additional localized  consolidation.    A CT angiogram from January 15, 2021 revealed the following  No acute pulmonary embolism seen to the segmental level.     Scattered ground-glass opacities and consolidation in the lingula  suggestive of multifocal pneumonia.    Laboratory test from February 8, 2025 outlined an eosinophil count of 384 with a percent eosinophilia of 6.5%.  On January 18, 2023 his eosinophil percent was 5.6% with a white count of 6.5 thousand    An echocardiogram from January 4, 2024 revealed the following   1. Left ventricular systolic function is normal with a 60-65% estimated ejection fraction.   2. Spectral Doppler shows an impaired relaxation pattern of left ventricular diastolic filling.   3. RVSP within normal limits.   4. There is moderate pulmonic valve regurgitation.    Currently, the patient reports that he had fairly severe COVID pneumonia in 2021.  He was hospitalized t and also received monoclonal antibodies.  He reports that over the last several years that he has had coughing that comes on in spasms.  It usually is in the morning and it is a nagging cough at times it can be quite severe.  He has been treated for reflux as well as sinus congestion.  Currently is on omeprazole and azelastine.  It has improved somewhat.  In addition, he has been on Flonase.  He does not have any seasonal allergies.  He has had COVID on 3 different occasions.  He worked as a  for 20 years and did have exposure in the overall period of time to burning cellulose and he ended up being hospitalized.  He does not remember any postexposure prolonged respiratory issues.  He has no  history of eczema.  He has had a myocardial infarction in the past with 3 stenting.  He smoked for 1 to 2 years in the remote past.  In addition, he notes a sighing respiration at times and perhaps he is more short of breath with strenuous exercise activities.  Probably mMRC grade 1.  He has no muscle aching just not normal degenerative changes.            Allergies   Allergen Reactions    Penicillins Unknown     in childhood        PAST MEDICAL HISTORY:   history of prostate cancer and was a former smoker.  He has hyperlipidemia and is noted to have chronic sinusitis with nasal polyposis.  He saw Dr. Vargas who noted significant sinus inflammation.  In addition, he has had a myocardial infarction in the past with 3 stents.    Current Outpatient Medications:     aspirin 81 mg EC tablet, Take 1 tablet (81 mg) by mouth once daily., Disp: , Rfl:     atorvastatin (Lipitor) 80 mg tablet, Take 1 tablet by mouth once daily, Disp: 90 tablet, Rfl: 3    azelastine (Astelin) 137 mcg (0.1 %) nasal spray, Administer 2 sprays into each nostril 2 times a day. Use in each nostril as directed, Disp: 30 mL, Rfl: 11    cyanocobalamin (Vitamin B-12) 1,000 mcg tablet, Take 1 tablet (1,000 mcg) by mouth every other day., Disp: , Rfl:     ergocalciferol (Vitamin D-2) 1.25 MG (77454 UT) capsule, Take 2 tablets by mouth once daily., Disp: , Rfl:     ezetimibe (Zetia) 10 mg tablet, Take 1 tablet (10 mg) by mouth once daily., Disp: 90 tablet, Rfl: 1    fluticasone (Flonase) 50 mcg/actuation nasal spray, Administer 1 spray into each nostril once daily., Disp: 16 g, Rfl: 0    fluticasone (Flonase) 50 mcg/actuation nasal spray, Use 1 spray(s) in each nostril once daily, Disp: 16 g, Rfl: 1    ipratropium (Atrovent) 21 mcg (0.03 %) nasal spray, Administer 2 sprays into each nostril 3 times a day., Disp: 30 mL, Rfl: 11    isosorbide mononitrate ER (Imdur) 30 mg 24 hr tablet, Take 1 tablet by mouth once daily, Disp: 90 tablet, Rfl: 3    magnesium  oxide 500 mg capsule, Take 1 capsule (500 mg) by mouth once daily., Disp: , Rfl:     nitroglycerin (Nitrostat) 0.4 mg SL tablet, Place 1 tablet (0.4 mg) under the tongue every 5 minutes if needed for chest pain., Disp: , Rfl:     omeprazole OTC (PriLOSEC OTC) 20 mg EC tablet, Take 1 tablet (20 mg) by mouth once daily in the morning. Take before meals. Do not crush, chew, or split., Disp: , Rfl:     ubidecarenone (COENZYME Q10, BULK, MISC), Take 1 capsule by mouth once daily., Disp: , Rfl:     methocarbamol (Robaxin) 500 mg tablet, Take 1 tablet (500 mg) by mouth 4 times a day as needed for muscle spasms., Disp: 40 tablet, Rfl: 3    Current Facility-Administered Medications:     Tc-99m tetrofosmin (Myoview) injection 11.3 millicurie, 11.3 millicurie, intravenous, Once in imaging, Edie Cevallos MD    Tc-99m tetrofosmin (Myoview) injection 34.3 millicurie, 34.3 millicurie, intravenous, Once in imaging, Edie Cevallos MD     FAMILY HISTORY:   There is no family history of respiratory problems such as COPD asthma allergies or hayfever  SOCIAL HISTORY:  He smoked for 1 to 2 years in the remote past.  He drinks some beer on occasion.  EXPOSURE AND WORK HISTORY:  For 20 years he worked as a  as outlined above.    Review of Systems   Constitutional:  Negative for fatigue, fever and unexpected weight change.   HENT:  Positive for postnasal drip. Negative for congestion, facial swelling, nosebleeds, rhinorrhea, sinus pressure and sinus pain.    Eyes:  Negative for discharge, redness and visual disturbance.   Respiratory:  Positive for cough and shortness of breath. Negative for apnea, choking, wheezing and stridor.    Cardiovascular:  Negative for chest pain, palpitations and leg swelling.   Gastrointestinal:  Negative for abdominal distention, abdominal pain, constipation and nausea.   Endocrine: Negative for cold intolerance and heat intolerance.   Genitourinary:  Negative for difficulty urinating, dysuria, frequency and  hematuria.   Musculoskeletal:  Negative for arthralgias, gait problem and joint swelling.   Allergic/Immunologic: Negative for environmental allergies, food allergies and immunocompromised state.   Neurological:  Negative for dizziness, tremors, syncope, speech difficulty, weakness, light-headedness, numbness and headaches.   Hematological:  Negative for adenopathy. Does not bruise/bleed easily.   Psychiatric/Behavioral:  Negative for agitation, behavioral problems and sleep disturbance. The patient is not nervous/anxious.         Vitals:    03/05/25 1106   BP: 135/85   Pulse: 75   Resp: 18   Temp: 36.1 °C (97 °F)   SpO2: 97%        Physical Exam  Vitals reviewed.   Constitutional:       Appearance: Normal appearance.   HENT:      Head: Normocephalic and atraumatic.   Eyes:      Extraocular Movements: Extraocular movements intact.   Cardiovascular:      Rate and Rhythm: Normal rate and regular rhythm.      Heart sounds: No murmur heard.     No friction rub. No gallop.   Pulmonary:      Effort: Pulmonary effort is normal. No respiratory distress.      Breath sounds: Normal breath sounds. No stridor. No wheezing, rhonchi or rales.   Chest:      Chest wall: No tenderness.   Abdominal:      General: Abdomen is flat. There is no distension.      Palpations: Abdomen is soft. There is no mass.      Tenderness: There is no abdominal tenderness.   Musculoskeletal:         General: Normal range of motion.      Cervical back: Normal range of motion.      Right lower leg: No edema.      Left lower leg: No edema.   Skin:     General: Skin is warm and dry.   Neurological:      Mental Status: He is alert and oriented to person, place, and time.   Psychiatric:         Mood and Affect: Mood normal.         Behavior: Behavior normal.

## 2025-03-05 NOTE — LETTER
March 5, 2025     GERARD Teran-CNP  [address] [facility] [address]    Patient: Shaheen Washington   YOB: 1950   Date of Visit: 3/5/2025       Dear Dr. Vilma Ojeda, GERARD-CNP:    Thank you for referring Shaheen Washington to me for evaluation. Below are my notes for this consultation.  If you have questions, please do not hesitate to call me. I look forward to following your patient along with you.       Sincerely,     Giovanni Thomas MD MPH      CC: No Recipients  ______________________________________________________________________________________    @PULMONARY CONSULTATION@         Reason for Consult: coughing and POSEY; ILD    ASSESSMENT:   The patient a 74-year-old with underlying coronary disease status post myocardial infarction and stenting.  He also has a history of prostate cancer and is being treated for sinus congestion and postnasal drip.  He has been noted to have some eosinophilia with eosinophil count on February 8, 2025 being 384.  He has no evidence of pulmonary hypertension on echocardiogram from last year.  He had severe COVID in 2021 and was hospitalized at the interstitial changes noted on his recent CT scan I suspect related to his previous COVID infection.  He has noted other definite clinical indications of diffuse fibrotic lung disease from a systemic disorder.  The cough he has may be related to his sinus congestion or possibly asthma with having eosinophilia.  He also potentially could have reflux.  The interstitial changes are not extensive and appear in a distribution similar to the groundglass changes noted in 2021.  His lungs are clear on auscultation.    PLAN:   I am going to order complete pulmonary functions, fractional exhalation of nitric oxide and 6-minute walk test.  These breathing test can be scheduled by calling     In addition, I am going to order labs for interstitial lung disease as well as an allergy panel.    Once results become  available I will discuss them with you.          HISTORY OF PRESENT ILLNESS:   The patient is a 74-year-old with a history of prostate cancer and was a former smoker.  He has hyperlipidemia and is noted to have chronic sinusitis with nasal polyposis.  He saw Dr. Vargas who noted significant sinus inflammation.  He was continued on fluticasone as well as ipratropium and azelastine.    The CT scan of the sinuses from January 9, 2025 revealed the following  1. Moderate diffuse paranasal sinus disease with obstructions in the  frontoethmoidal and sphenoethmoidal recesses as well as the right  maxillary ostia. Minimal progression of disease since the comparison  exam.  2.Intracranial atherosclerotic vascular disease.      The chest CT scan from January 10, 2025 revealed the following  1.  Multifocal irregular peripheral interstitial thickening and tiny  bulla which may represent mild fibrosis. No additional localized  consolidation.    A CT angiogram from January 15, 2021 revealed the following  No acute pulmonary embolism seen to the segmental level.     Scattered ground-glass opacities and consolidation in the lingula  suggestive of multifocal pneumonia.    Laboratory test from February 8, 2025 outlined an eosinophil count of 384 with a percent eosinophilia of 6.5%.  On January 18, 2023 his eosinophil percent was 5.6% with a white count of 6.5 thousand    An echocardiogram from January 4, 2024 revealed the following   1. Left ventricular systolic function is normal with a 60-65% estimated ejection fraction.   2. Spectral Doppler shows an impaired relaxation pattern of left ventricular diastolic filling.   3. RVSP within normal limits.   4. There is moderate pulmonic valve regurgitation.    Currently, the patient reports that he had fairly severe COVID pneumonia in 2021.  He was hospitalized t and also received monoclonal antibodies.  He reports that over the last several years that he has had coughing that comes on  in spasms.  It usually is in the morning and it is a nagging cough at times it can be quite severe.  He has been treated for reflux as well as sinus congestion.  Currently is on omeprazole and azelastine.  It has improved somewhat.  In addition, he has been on Flonase.  He does not have any seasonal allergies.  He has had COVID on 3 different occasions.  He worked as a  for 20 years and did have exposure in the overall period of time to burning cellulose and he ended up being hospitalized.  He does not remember any postexposure prolonged respiratory issues.  He has no history of eczema.  He has had a myocardial infarction in the past with 3 stenting.  He smoked for 1 to 2 years in the remote past.  In addition, he notes a sighing respiration at times and perhaps he is more short of breath with strenuous exercise activities.  Probably mMRC grade 1.  He has no muscle aching just not normal degenerative changes.            Allergies   Allergen Reactions   • Penicillins Unknown     in childhood        PAST MEDICAL HISTORY:   history of prostate cancer and was a former smoker.  He has hyperlipidemia and is noted to have chronic sinusitis with nasal polyposis.  He saw Dr. Vargas who noted significant sinus inflammation.  In addition, he has had a myocardial infarction in the past with 3 stents.    Current Outpatient Medications:   •  aspirin 81 mg EC tablet, Take 1 tablet (81 mg) by mouth once daily., Disp: , Rfl:   •  atorvastatin (Lipitor) 80 mg tablet, Take 1 tablet by mouth once daily, Disp: 90 tablet, Rfl: 3  •  azelastine (Astelin) 137 mcg (0.1 %) nasal spray, Administer 2 sprays into each nostril 2 times a day. Use in each nostril as directed, Disp: 30 mL, Rfl: 11  •  cyanocobalamin (Vitamin B-12) 1,000 mcg tablet, Take 1 tablet (1,000 mcg) by mouth every other day., Disp: , Rfl:   •  ergocalciferol (Vitamin D-2) 1.25 MG (30333 UT) capsule, Take 2 tablets by mouth once daily., Disp: , Rfl:   •   ezetimibe (Zetia) 10 mg tablet, Take 1 tablet (10 mg) by mouth once daily., Disp: 90 tablet, Rfl: 1  •  fluticasone (Flonase) 50 mcg/actuation nasal spray, Administer 1 spray into each nostril once daily., Disp: 16 g, Rfl: 0  •  fluticasone (Flonase) 50 mcg/actuation nasal spray, Use 1 spray(s) in each nostril once daily, Disp: 16 g, Rfl: 1  •  ipratropium (Atrovent) 21 mcg (0.03 %) nasal spray, Administer 2 sprays into each nostril 3 times a day., Disp: 30 mL, Rfl: 11  •  isosorbide mononitrate ER (Imdur) 30 mg 24 hr tablet, Take 1 tablet by mouth once daily, Disp: 90 tablet, Rfl: 3  •  magnesium oxide 500 mg capsule, Take 1 capsule (500 mg) by mouth once daily., Disp: , Rfl:   •  nitroglycerin (Nitrostat) 0.4 mg SL tablet, Place 1 tablet (0.4 mg) under the tongue every 5 minutes if needed for chest pain., Disp: , Rfl:   •  omeprazole OTC (PriLOSEC OTC) 20 mg EC tablet, Take 1 tablet (20 mg) by mouth once daily in the morning. Take before meals. Do not crush, chew, or split., Disp: , Rfl:   •  ubidecarenone (COENZYME Q10, BULK, MISC), Take 1 capsule by mouth once daily., Disp: , Rfl:   •  methocarbamol (Robaxin) 500 mg tablet, Take 1 tablet (500 mg) by mouth 4 times a day as needed for muscle spasms., Disp: 40 tablet, Rfl: 3    Current Facility-Administered Medications:   •  Tc-99m tetrofosmin (Myoview) injection 11.3 millicurie, 11.3 millicurie, intravenous, Once in imaging, Edie Cevallos MD  •  Tc-99m tetrofosmin (Myoview) injection 34.3 millicurie, 34.3 millicurie, intravenous, Once in imaging, Edie Cevallos MD     FAMILY HISTORY:   There is no family history of respiratory problems such as COPD asthma allergies or hayfever  SOCIAL HISTORY:  He smoked for 1 to 2 years in the remote past.  He drinks some beer on occasion.  EXPOSURE AND WORK HISTORY:  For 20 years he worked as a  as outlined above.    Review of Systems   Constitutional:  Negative for fatigue, fever and unexpected weight change.   HENT:   Positive for postnasal drip. Negative for congestion, facial swelling, nosebleeds, rhinorrhea, sinus pressure and sinus pain.    Eyes:  Negative for discharge, redness and visual disturbance.   Respiratory:  Positive for cough and shortness of breath. Negative for apnea, choking, wheezing and stridor.    Cardiovascular:  Negative for chest pain, palpitations and leg swelling.   Gastrointestinal:  Negative for abdominal distention, abdominal pain, constipation and nausea.   Endocrine: Negative for cold intolerance and heat intolerance.   Genitourinary:  Negative for difficulty urinating, dysuria, frequency and hematuria.   Musculoskeletal:  Negative for arthralgias, gait problem and joint swelling.   Allergic/Immunologic: Negative for environmental allergies, food allergies and immunocompromised state.   Neurological:  Negative for dizziness, tremors, syncope, speech difficulty, weakness, light-headedness, numbness and headaches.   Hematological:  Negative for adenopathy. Does not bruise/bleed easily.   Psychiatric/Behavioral:  Negative for agitation, behavioral problems and sleep disturbance. The patient is not nervous/anxious.         Vitals:    03/05/25 1106   BP: 135/85   Pulse: 75   Resp: 18   Temp: 36.1 °C (97 °F)   SpO2: 97%        Physical Exam  Vitals reviewed.   Constitutional:       Appearance: Normal appearance.   HENT:      Head: Normocephalic and atraumatic.   Eyes:      Extraocular Movements: Extraocular movements intact.   Cardiovascular:      Rate and Rhythm: Normal rate and regular rhythm.      Heart sounds: No murmur heard.     No friction rub. No gallop.   Pulmonary:      Effort: Pulmonary effort is normal. No respiratory distress.      Breath sounds: Normal breath sounds. No stridor. No wheezing, rhonchi or rales.   Chest:      Chest wall: No tenderness.   Abdominal:      General: Abdomen is flat. There is no distension.      Palpations: Abdomen is soft. There is no mass.      Tenderness: There  is no abdominal tenderness.   Musculoskeletal:         General: Normal range of motion.      Cervical back: Normal range of motion.      Right lower leg: No edema.      Left lower leg: No edema.   Skin:     General: Skin is warm and dry.   Neurological:      Mental Status: He is alert and oriented to person, place, and time.   Psychiatric:         Mood and Affect: Mood normal.         Behavior: Behavior normal.

## 2025-03-05 NOTE — PATIENT INSTRUCTIONS
I am going to order complete pulmonary functions, fractional exhalation of nitric oxide and 6-minute walk test.  These breathing test can be scheduled by calling     In addition, I am going to order labs for interstitial lung disease as well as an allergy panel.    Once results become available I will discuss them with you.

## 2025-03-06 LAB
A ALTERNATA IGE QN: <0.1 KU/L
A ALTERNATA IGE RAST: 0
A FUMIGATUS IGE QN: <0.1 KU/L
A FUMIGATUS IGE RAST: 0
BERMUDA GRASS IGE QN: <0.1 KU/L
BERMUDA GRASS IGE RAST: 0
BOXELDER IGE QN: <0.1 KU/L
BOXELDER IGE RAST: 0
C HERBARUM IGE QN: <0.1 KU/L
C HERBARUM IGE RAST: 0
CALIF WALNUT POLN IGE QN: <0.1 KU/L
CALIF WALNUT POLN IGE RAST: 0
CAT DANDER IGE QN: <0.1 KU/L
CAT DANDER IGE RAST: 0
CMN PIGWEED IGE QN: <0.1 KU/L
CMN PIGWEED IGE RAST: 0
COMMON RAGWEED IGE QN: <0.1 KU/L
COMMON RAGWEED IGE RAST: 0
COTTONWOOD IGE QN: <0.1 KU/L
COTTONWOOD IGE RAST: 0
D FARINAE IGE QN: <0.1 KU/L
D FARINAE IGE RAST: 0
D PTERONYSS IGE QN: <0.1 KU/L
D PTERONYSS IGE RAST: 0
DOG DANDER IGE QN: <0.1 KU/L
DOG DANDER IGE RAST: 0
IGE SERPL-ACNC: 51 KU/L
LONDON PLANE IGE QN: <0.1 KU/L
LONDON PLANE IGE RAST: 0
MOUSE URINE PROT IGE QN: <0.1 KU/L
MOUSE URINE PROT IGE RAST: 0
MT JUNIPER IGE QN: <0.1 KU/L
MT JUNIPER IGE RAST: 0
P NOTATUM IGE QN: <0.1 KU/L
P NOTATUM IGE RAST: 0
PECAN/HICK TREE IGE QN: <0.1 KU/L
PECAN/HICK TREE IGE RAST: 0
REF LAB TEST REF RANGE: NORMAL
ROACH IGE QN: <0.1 KU/L
ROACH IGE RAST: 0
SALTWORT IGE QN: <0.1 KU/L
SALTWORT IGE RAST: 0
SHEEP SORREL IGE QN: <0.1 KU/L
SHEEP SORREL IGE RAST: 0
SILVER BIRCH IGE QN: <0.1 KU/L
SILVER BIRCH IGE RAST: 0
TIMOTHY IGE QN: <0.1 KU/L
TIMOTHY IGE RAST: 0
WHITE ASH IGE QN: <0.1 KU/L
WHITE ASH IGE RAST: 0
WHITE ELM IGE QN: <0.1 KU/L
WHITE ELM IGE RAST: 0
WHITE MULBERRY IGE QN: <0.1 KU/L
WHITE MULBERRY IGE RAST: 0
WHITE OAK IGE QN: <0.1 KU/L
WHITE OAK IGE RAST: 0

## 2025-03-09 LAB
A FUMIGATUS AB SER QL ID: NORMAL
ACE SERPL-CCNC: 54 U/L (ref 9–67)
ANA SER QL IF: NEGATIVE
CCP IGG SERPL-ACNC: <16 UNITS
CK SERPL-CCNC: 85 U/L (ref 19–278)
CN-1A IGG SERPL QL IA: <5 UNITS
EJ AB SER QL: NORMAL
ENA JO1 AB SER QL IB: NORMAL
ERYTHROCYTE [SEDIMENTATION RATE] IN BLOOD BY WESTERGREN METHOD: 2 MM/H
HMGCR IGG SER IA-ACNC: <2 CU
MDA5 AB SER LINE BLOT-ACNC: NORMAL
MI-2 ALPHA AB SER LINE BLOT-ACNC: NORMAL
MI-2 BETA AB SER LINE BLOT-ACNC: NORMAL
MJ AB SER LINE BLOT-ACNC: NORMAL
OJ AB SER QL IB: NORMAL
PIGEON SERUM AB QL ID: NORMAL
PL12 AB SER QL IB: NORMAL
PL7 AB SER QL IB: NORMAL
RHEUMATOID FACT SERPL-ACNC: <10 IU/ML
S RECTIVIRGULA AB SER QL ID: NORMAL
S VIRIDIS AB SER QL ID: NORMAL
SRP AB SERPL QL IB: NORMAL
T CANDIDUS AB SER QL: NORMAL
T VULGARIS AB SER QL ID: NORMAL
TIF1-GAMMA AB SER LINE BLOT-ACNC: NORMAL

## 2025-03-11 ENCOUNTER — APPOINTMENT (OUTPATIENT)
Dept: OTOLARYNGOLOGY | Facility: CLINIC | Age: 75
End: 2025-03-11
Payer: MEDICARE

## 2025-03-11 ENCOUNTER — HOSPITAL ENCOUNTER (OUTPATIENT)
Dept: RESPIRATORY THERAPY | Facility: HOSPITAL | Age: 75
Discharge: HOME | End: 2025-03-11
Payer: MEDICARE

## 2025-03-11 ENCOUNTER — DOCUMENTATION (OUTPATIENT)
Dept: PULMONOLOGY | Facility: HOSPITAL | Age: 75
End: 2025-03-11

## 2025-03-11 DIAGNOSIS — R06.09 DOE (DYSPNEA ON EXERTION): ICD-10-CM

## 2025-03-11 DIAGNOSIS — R05.3 CHRONIC COUGH: ICD-10-CM

## 2025-03-11 PROCEDURE — 94729 DIFFUSING CAPACITY: CPT | Performed by: INTERNAL MEDICINE

## 2025-03-11 PROCEDURE — 94060 EVALUATION OF WHEEZING: CPT | Performed by: INTERNAL MEDICINE

## 2025-03-11 PROCEDURE — 94618 PULMONARY STRESS TESTING: CPT | Performed by: INTERNAL MEDICINE

## 2025-03-11 PROCEDURE — 94726 PLETHYSMOGRAPHY LUNG VOLUMES: CPT

## 2025-03-11 PROCEDURE — 94726 PLETHYSMOGRAPHY LUNG VOLUMES: CPT | Performed by: INTERNAL MEDICINE

## 2025-03-11 NOTE — PROGRESS NOTES
In  evaluating the patient for his cough he has some mild interstitial changes which which may be post-COVID related.  He does have some eosinophilia but his FeNO level and respiratory allergy panel are totally negative.  Interestingly, his cough is 80% better on omeprazole.  This obviously suggests reflux.  His PFTs are entirely normal except he has some flattening of the inspiratory curve of the flow-volume curve.  Possibly he has variable extrathoracic obstruction.  I am going to send him to ENT for completeness.  He will follow-up with me in 3 months.

## 2025-03-12 ENCOUNTER — TELEPHONE (OUTPATIENT)
Dept: OTOLARYNGOLOGY | Facility: CLINIC | Age: 75
End: 2025-03-12
Payer: MEDICARE

## 2025-03-12 LAB
A FUMIGATUS AB SER QL ID: NEGATIVE
ACE SERPL-CCNC: 54 U/L (ref 9–67)
ANA SER QL IF: NEGATIVE
CCP IGG SERPL-ACNC: <16 UNITS
CK SERPL-CCNC: 85 U/L (ref 19–278)
CN-1A IGG SERPL QL IA: <5 UNITS
EJ AB SER QL: <11 SI
ENA JO1 AB SER QL IB: <11 SI
ERYTHROCYTE [SEDIMENTATION RATE] IN BLOOD BY WESTERGREN METHOD: 2 MM/H
HMGCR IGG SER IA-ACNC: <2 CU
MDA5 AB SER LINE BLOT-ACNC: <11 SI
MI-2 ALPHA AB SER LINE BLOT-ACNC: <11 SI
MI-2 BETA AB SER LINE BLOT-ACNC: <11 SI
MJ AB SER LINE BLOT-ACNC: <11 SI
OJ AB SER QL IB: <11 SI
PIGEON SERUM AB QL ID: NEGATIVE
PL12 AB SER QL IB: <11 SI
PL7 AB SER QL IB: <11 SI
RHEUMATOID FACT SERPL-ACNC: <10 IU/ML
S RECTIVIRGULA AB SER QL ID: NEGATIVE
S VIRIDIS AB SER QL ID: NEGATIVE
SRP AB SERPL QL IB: <11 SI
T CANDIDUS AB SER QL: NEGATIVE
T VULGARIS AB SER QL ID: NEGATIVE
TIF1-GAMMA AB SER LINE BLOT-ACNC: <11 SI

## 2025-03-12 NOTE — TELEPHONE ENCOUNTER
Patient notified per Dr. Vargas and Dr. Thomas that it is recommended that Shaheen has an evaluation with Dr. Steward to assess his vocal cords. Patient verbalizes understanding of this plan and Dr. Steward's office notified of patient contact information and to arrange him for a NPV.

## 2025-03-24 ENCOUNTER — TELEPHONE (OUTPATIENT)
Dept: OTOLARYNGOLOGY | Facility: CLINIC | Age: 75
End: 2025-03-24
Payer: MEDICARE

## 2025-03-24 NOTE — TELEPHONE ENCOUNTER
Patient contacted the office today stating that the ipratropium 2 sprays each nostril TID has caused him to experience dryness in his nostrils with some dried blood and dry mouth. He states he was experiencing a lot of mucous in the morning in his throat which is still pronounced and he is unable to clear, but it helped the original symptom of hacking cough. Discussed with Dr. Vargas who advised patient stop taking the Ipratropium at this time and continue with Azelastine. Patient to follow up as scheduled on 4/08/25.

## 2025-04-05 NOTE — PROGRESS NOTES
"             Sinus & Sk[facility] Surgery    Chief Complaint:  1.  Chronic sinusitis with nasal polyposis  2.  Postnasal drainage  3.  Nasal airway obstruction; deviated nasal septum  4.  Throat clearing, coughing, dysphonia  5.  Reflux on PPI  6.  Mild pulmonary fibrosis  7.  History of esophageal dilation; no current dysphagia    History Of Present Illness:  Shaheen Washington presents since last being seen 2/10/2025.     Following his evaluation with me, he was evaluated by Dr. Thomas March 5, 2025.  He underwent pulmonary workup:    \"In evaluating the patient for his cough he has some mild interstitial changes which which may be post-COVID related.  He does have some eosinophilia but his FeNO level and respiratory allergy panel are totally negative.  Interestingly, his cough is 80% better on omeprazole.  This obviously suggests reflux.  His PFTs are entirely normal except he has some flattening of the inspiratory curve of the flow-volume curve.  Possibly he has variable extrathoracic obstruction.\"     He continues to have the sensation of mucus in his throat.  He contacted my office at the end of March and at that time I recommended holding ipratropium but continuing azelastine.  He has been on omeprazole 20 mg once per day.      Main Symptoms:  Patient has anterior nasal drainage.  Not common.    Patient has posterior nasal drainage.  Patient does not have nasal airway obstruction.  Patient does not have facial pain.  Patient does not have facial pressure.  Patient does not have decreased sense of smell.   Associated Symptoms:  Patient does not have headaches.  Patient has throat clearing.  Persistent; 80% improved  Patient has coughing.  Persistent; 80% improved  Patient has dysphonia.  Patient does not have nasal bleeding.    Medications currently on for sinonasal symptoms:  Omeprazole 20mg Qday  Flonase 1 puff each side Qam  Azelastine 2 puffs each side BID  Cetirizine Qday    Active Problems:  Patient Active " Problem List   Diagnosis    Bradycardia    Chest pain, atypical    Colon polyps    Coronary artery disease    Fatigue    Fever    Herpes zoster    History of vitamin D deficiency    Lesion of buccal mucosa    Overweight    Palpitations    Pure hypercholesterolemia    Anorectal polyp    Arthritis    Contusion of finger of left hand    Dysphagia    Effusion of joint of left hand    History of adenomatous polyp of colon    Irregular heart rhythm    Kidney stones    Left hand pain    Lumbar disc herniation    Lumbar foraminal stenosis    Lumbar neuritis    Lumbosacral stenosis    Osteopenia    GERD (gastroesophageal reflux disease)    Shortness of breath    Malignant neoplasm of prostate (Multi)    Laceration of left thumb    Hypertension    External hemorrhoids    Status post total hip replacement, right    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    Surgical History:  He has a past surgical history that includes Other surgical history (12/13/2019); Other surgical history (12/13/2019); Other surgical history (12/13/2019); Other surgical history (10/04/2021); Other surgical history (04/24/2020); Other surgical history (04/24/2020); Other surgical history (06/16/2022); and Other surgical history (06/16/2022).    Family History:  Family History   Problem Relation Name Age of Onset    No Known Problems Father       Social History:  He reports that he has quit smoking. His smoking use included cigarettes. He has never been exposed to tobacco smoke. He has never used smokeless tobacco. He reports current alcohol use. He reports that he does not use drugs.    Allergies:  Penicillins    Current Meds:  Current Outpatient Medications:     aspirin 81 mg EC tablet, Take 1 tablet (81 mg) by mouth once daily., Disp: , Rfl:     atorvastatin (Lipitor) 80 mg tablet, Take 1 tablet by mouth once daily, Disp: 90 tablet, Rfl: 3    azelastine  "(Astelin) 137 mcg (0.1 %) nasal spray, Administer 2 sprays into each nostril 2 times a day. Use in each nostril as directed, Disp: 30 mL, Rfl: 11    cyanocobalamin (Vitamin B-12) 1,000 mcg tablet, Take 1 tablet (1,000 mcg) by mouth every other day., Disp: , Rfl:     ergocalciferol (Vitamin D-2) 1.25 MG (43407 UT) capsule, Take 2 tablets by mouth once daily., Disp: , Rfl:     ezetimibe (Zetia) 10 mg tablet, Take 1 tablet (10 mg) by mouth once daily., Disp: 90 tablet, Rfl: 1    fluticasone (Flonase) 50 mcg/actuation nasal spray, Administer 1 spray into each nostril once daily., Disp: 16 g, Rfl: 0    fluticasone (Flonase) 50 mcg/actuation nasal spray, Use 1 spray(s) in each nostril once daily, Disp: 16 g, Rfl: 1    ipratropium (Atrovent) 21 mcg (0.03 %) nasal spray, Administer 2 sprays into each nostril 3 times a day., Disp: 30 mL, Rfl: 11    isosorbide mononitrate ER (Imdur) 30 mg 24 hr tablet, Take 1 tablet by mouth once daily, Disp: 90 tablet, Rfl: 3    magnesium oxide 500 mg capsule, Take 1 capsule (500 mg) by mouth once daily., Disp: , Rfl:     nitroglycerin (Nitrostat) 0.4 mg SL tablet, Place 1 tablet (0.4 mg) under the tongue every 5 minutes if needed for chest pain., Disp: , Rfl:     omeprazole OTC (PriLOSEC OTC) 20 mg EC tablet, Take 1 tablet (20 mg) by mouth once daily in the morning. Take before meals. Do not crush, chew, or split., Disp: , Rfl:     ubidecarenone (COENZYME Q10, BULK, MISC), Take 1 capsule by mouth once daily., Disp: , Rfl:     methocarbamol (Robaxin) 500 mg tablet, Take 1 tablet (500 mg) by mouth 4 times a day as needed for muscle spasms., Disp: 40 tablet, Rfl: 3    Current Facility-Administered Medications:     Tc-99m tetrofosmin (Myoview) injection 11.3 millicurie, 11.3 millicurie, intravenous, Once in imaging, Edie Cevallos MD    Tc-99m tetrofosmin (Myoview) injection 34.3 millicurie, 34.3 millicurie, intravenous, Once in imaging, Edie Cevallos MD    Vitals:  Visit Vitals   1.702 m (5' 7\") "   Wt 81.6 kg (180 lb)   BMI 28.19 kg/m²   Smoking Status Former   BSA 1.96 m²     Physical Exam:  Nose: On external exam there are neither lesions nor asymmetry of the nasal tip/dorsum. On anterior rhinoscopy, visualization posteriorly is limited on anterior examination. For this reason, to adequately evaluate posteriorly for masses, source of epistaxis, polypoid disease, debridement, and/or signs of infections, nasal endoscopy is indicated.  (Please see procedure below.)    SINONASAL ENDOSCOPY (CPT 10715): To better evaluate the patient's symptoms, sinonasal endoscopy is indicated.  After discussion of risks and benefits, and topical decongestion and anesthesia, an endoscope was used to perform nasal endoscopy on each side.  A time out identifying the patient, the procedure, the location of the procedure and any concerns was performed prior to beginning the procedure.    Findings:  Examination of the right nasal cavity revealed no pus in the middle meatus and sphenoethmoid recess. There was polyp tissue in the sphenoethmoid recess adjacent to the superior turbinate. Examination of the left nasal cavity revealed a normal middle meatus and sphenoethmoid recess without pus or polyps.  His septum was deviated to the left.    Results/Data:  I personally reviewed the note from Dr. Giovanni Thomas dated 3/11/2025.  I personally reviewed his last CT sinus from January 9, 2025.  There were inflammatory changes throughout his sinuses but these were most notable in each frontal and each ethmoid.    Provider Impressions:  1.  Chronic sinusitis with nasal polyposis  2.  Postnasal drainage  3.  Deviated nasal septum  4.  Throat clearing, coughing, dysphonia -- improved with omeprazole  5.  Mild pulmonary fibrosis  6.  History of esophageal dilation; no current dysphagia    Discussion:  Shaheen Washington and I discussed his exam and symptoms.  He has additional intranasal options that he could consider including rinse based medical  therapies.  He could consider something like acetylcysteine as a mucus thinning agent.    I recommended continuation of omeprazole.  Dr. Thomas recommended a voice consult based on his pulmonary function testing and this is scheduled for later in April with Dr. Steward.  I would like to see him virtually after this evaluation to discuss next steps.  Certainly based on his previous CT, he would have surgical options but I would want to make sure that all medical avenues have been exhausted prior to considering surgical intervention particularly for laryngeal symptomatology.  He was amenable to this and all questions were answered.    Scribe Attestation  By signing my name below, I, Renny Keller, attest that this documentation has been prepared under the direction and in the presence of Martinez Vargas MD.    Signature:  Martinez Vargas MD

## 2025-04-08 ENCOUNTER — APPOINTMENT (OUTPATIENT)
Dept: OTOLARYNGOLOGY | Facility: CLINIC | Age: 75
End: 2025-04-08
Payer: MEDICARE

## 2025-04-08 VITALS — WEIGHT: 180 LBS | BODY MASS INDEX: 28.25 KG/M2 | HEIGHT: 67 IN

## 2025-04-08 DIAGNOSIS — J32.1 CHRONIC FRONTAL SINUSITIS: ICD-10-CM

## 2025-04-08 DIAGNOSIS — J32.2 CHRONIC ETHMOIDAL SINUSITIS: Primary | ICD-10-CM

## 2025-04-08 DIAGNOSIS — J34.2 DEVIATED NASAL SEPTUM: ICD-10-CM

## 2025-04-08 DIAGNOSIS — R09.82 POST-NASAL DRAINAGE: ICD-10-CM

## 2025-04-08 PROCEDURE — 31231 NASAL ENDOSCOPY DX: CPT | Performed by: OTOLARYNGOLOGY

## 2025-04-08 PROCEDURE — 3008F BODY MASS INDEX DOCD: CPT | Performed by: OTOLARYNGOLOGY

## 2025-04-08 PROCEDURE — 99213 OFFICE O/P EST LOW 20 MIN: CPT | Performed by: OTOLARYNGOLOGY

## 2025-04-08 PROCEDURE — 1159F MED LIST DOCD IN RCRD: CPT | Performed by: OTOLARYNGOLOGY

## 2025-04-08 ASSESSMENT — PATIENT HEALTH QUESTIONNAIRE - PHQ9
1. LITTLE INTEREST OR PLEASURE IN DOING THINGS: NOT AT ALL
SUM OF ALL RESPONSES TO PHQ9 QUESTIONS 1 AND 2: 0
2. FEELING DOWN, DEPRESSED OR HOPELESS: NOT AT ALL

## 2025-04-24 ENCOUNTER — APPOINTMENT (OUTPATIENT)
Dept: OTOLARYNGOLOGY | Facility: CLINIC | Age: 75
End: 2025-04-24
Payer: MEDICARE

## 2025-04-24 VITALS — HEIGHT: 67 IN | WEIGHT: 182 LBS | BODY MASS INDEX: 28.56 KG/M2

## 2025-04-24 DIAGNOSIS — K21.9 LARYNGOPHARYNGEAL REFLUX (LPR): ICD-10-CM

## 2025-04-24 DIAGNOSIS — R49.8 VOICE FATIGUE: ICD-10-CM

## 2025-04-24 DIAGNOSIS — R49.0 MUSCLE TENSION DYSPHONIA: ICD-10-CM

## 2025-04-24 DIAGNOSIS — R05.3 UNEXPLAINED CHRONIC COUGH: ICD-10-CM

## 2025-04-24 DIAGNOSIS — R09.89 CHRONIC THROAT CLEARING: ICD-10-CM

## 2025-04-24 DIAGNOSIS — R09.A2 GLOBUS PHARYNGEUS: ICD-10-CM

## 2025-04-24 DIAGNOSIS — R49.0 VOICE HOARSENESS: Primary | ICD-10-CM

## 2025-04-24 PROCEDURE — 99214 OFFICE O/P EST MOD 30 MIN: CPT | Performed by: OTOLARYNGOLOGY

## 2025-04-24 PROCEDURE — 31579 LARYNGOSCOPY TELESCOPIC: CPT | Performed by: OTOLARYNGOLOGY

## 2025-04-24 PROCEDURE — 1159F MED LIST DOCD IN RCRD: CPT | Performed by: OTOLARYNGOLOGY

## 2025-04-24 PROCEDURE — 3008F BODY MASS INDEX DOCD: CPT | Performed by: OTOLARYNGOLOGY

## 2025-04-24 NOTE — PROGRESS NOTES
"ASSESSMENT AND PLAN:   Shaheen Washington is a 75 y.o. male with a history of sinusitis and chronic cough that has improved on PPIs and nasal medications. He has mild reflux changes. He will cosnider increasing this to BID if not success. He will trial Gaviscon after meals and before his bedtime. He will follow up with Dr. Vargas for management of sinus disease        Reason For Consult  No chief complaint on file.    HISTORY OF PRESENT ILLNESS:  Shaheen Washington is a 75 y.o. male presenting for a follow up visit with me for chronic cough.  The patient reports that his cough started with COVID. He has SOB that has also been present for a while.   This has improved with is PPI.     Prior History:   Chronic cough seen in past by Dr. Vargas - last 4/8/25.  History of esophageal dilation; no current dysphagia reported.     Previously underwent pulmonary workup (Dr. Giovanni Thomas dated 3/11/2025):     Dr. Thomas recommended a voice consult based on his pulmonary function testing and this is scheduled for later in April with Dr. Steward.     \"In evaluating the patient for his cough he has some mild interstitial changes which which may be post-COVID related.  He does have some eosinophilia but his FeNO level and respiratory allergy panel are totally negative.  Interestingly, his cough is 80% better on omeprazole.  This obviously suggests reflux.  His PFTs are entirely normal except he has some flattening of the inspiratory curve of the flow-volume curve.  Possibly he has variable extrathoracic obstruction.\"     Dr. Vargas recommended holding ipratropium but continuing azelastine.  He has been on omeprazole 20 mg once per day.       Past Medical History  He has a past medical history of Prostate cancer (Multi). Surgical History  He has a past surgical history that includes Other surgical history (12/13/2019); Other surgical history (12/13/2019); Other surgical history (12/13/2019); Other surgical history (10/04/2021); " Other surgical history (04/24/2020); Other surgical history (04/24/2020); Other surgical history (06/16/2022); and Other surgical history (06/16/2022).   Social History  He reports that he has quit smoking. His smoking use included cigarettes. He has never been exposed to tobacco smoke. He has never used smokeless tobacco. He reports current alcohol use. He reports that he does not use drugs. Allergies  Penicillins     Family History  Family History[1]    Review of Systems  All 10 systems were reviewed and negative except for above.      Last Recorded Vitals  There were no vitals taken for this visit.    Physical Exam  ENT Physical Exam  Constitutional  Appearance: patient appears well-developed and well-nourished,  Head and Face  Appearance: head appears normal and face appears normal;  Ear  Auricles: right auricle normal; left auricle normal;  Nose  External Nose: nares patent bilaterally;  Oral Cavity/Oropharynx  Lips: normal;  Neck  Neck: neck normal; neck palpation normal;  Respiratory  Inspection: no retractions visible;  Cardiovascular  Inspection: no peripheral edema present;  Neurovestibular  Mental Status: alert and oriented;  Psychiatric: mood normal;  Cranial Nerves: cranial nerves intact;        Procedures   Flexible Laryngoscopy w/ Videostroboscopy    VOICE AND SPEECH CHARACTERISTICS:  Normal spoken speech, (+) mild dysphonia, (+) mild roughness, no breathiness, no asthenia, no strain.  Intelligibility: normal.   Resonance: balanced.   Vocal Loudness: normal.   Breath Support: normal.    PROCEDURE:    Indications: voice change  PROCEDURE NOTE: FLEXIBLE LARYNGOSCOPY WITH STROBOSCOPY  I recommended a flexible laryngoscopy with stroboscopy based on PE findings, and/or concern for mucosal wave details based upon history and/or for issues associated with hyperreflexic gag on mirror exam concerning for pathology. Risks, benefits, and alternatives were explained. The patient wishes to proceed and gives verbal  consent.   Patient is seated in the exam chair. After adequate topical anesthesia, I advance the flexible endoscope. The examination included evaluation of the lemus, vallecula, base of tongue, pyriforms, post-cricoid area, larynx and immediate subglottis.  Findings : assessment of the nasopharynx, base of tongue/vallecula, pyriform sinuses, post-cricoid area and pharyngeal walls was without lesion or mass, pharyngeal wall contraction is normal and symmetric, and no pooling of secretions  ventricular obliteration: 2 (present), erythema/hyperemia: 2 (arytenoids), and IA thickenin (mild)  Gross Arytenoid Movement: symmetric.  Arytenoid Height: normal.   Supraglottic Tension: lateral.  Symmetry: normal.   Amplitude: normal.  Phase Closure: in-phase.  Mucosal Wave Lateral Excursion/Secondary Wave: Bilateral Vocal Cord: no restriction - wave moved more than ½ the width of the vocal fold.  Periodicity: normal.  Closure: post. gap.      Time Spent  Prep time on day of patient encounter: 10 minutes  Time spent directly with patient, family or caregiver: 15 minutes  Additional Time Spent on Patient Care Activities/Discussion with SLP re care plan: 5 minutes  Documentation Time: 10 minutes  Other Time Spent: 0 minutes  Total: 40 minutes     Scribe Attestation  By signing my name below, I, Nicole Ricardo , Scribgloria attest that this documentation has been prepared under the direction and in the presence of Yonny Steward MD.           [1]   Family History  Problem Relation Name Age of Onset    No Known Problems Father

## 2025-04-30 ENCOUNTER — TELEPHONE (OUTPATIENT)
Dept: PRIMARY CARE | Facility: CLINIC | Age: 75
End: 2025-04-30
Payer: MEDICARE

## 2025-04-30 DIAGNOSIS — B96.89 ACUTE BACTERIAL SINUSITIS: Primary | ICD-10-CM

## 2025-04-30 DIAGNOSIS — J01.90 ACUTE BACTERIAL SINUSITIS: Primary | ICD-10-CM

## 2025-04-30 RX ORDER — DOXYCYCLINE 100 MG/1
100 CAPSULE ORAL 2 TIMES DAILY
Qty: 14 CAPSULE | Refills: 0 | Status: SHIPPED | OUTPATIENT
Start: 2025-04-30 | End: 2025-05-07

## 2025-05-01 ENCOUNTER — OFFICE VISIT (OUTPATIENT)
Dept: PAIN MEDICINE | Facility: CLINIC | Age: 75
End: 2025-05-01
Payer: MEDICARE

## 2025-05-01 VITALS
HEIGHT: 67 IN | HEART RATE: 77 BPM | OXYGEN SATURATION: 96 % | SYSTOLIC BLOOD PRESSURE: 132 MMHG | RESPIRATION RATE: 15 BRPM | WEIGHT: 182 LBS | DIASTOLIC BLOOD PRESSURE: 66 MMHG | BODY MASS INDEX: 28.56 KG/M2 | TEMPERATURE: 97.2 F

## 2025-05-01 DIAGNOSIS — M48.07 LUMBOSACRAL STENOSIS: ICD-10-CM

## 2025-05-01 DIAGNOSIS — M51.26 LUMBAR DISC HERNIATION: Primary | ICD-10-CM

## 2025-05-01 DIAGNOSIS — M54.16 LUMBAR NEURITIS: ICD-10-CM

## 2025-05-01 DIAGNOSIS — M48.061 LUMBAR FORAMINAL STENOSIS: ICD-10-CM

## 2025-05-01 PROCEDURE — 99214 OFFICE O/P EST MOD 30 MIN: CPT | Performed by: ANESTHESIOLOGY

## 2025-05-01 ASSESSMENT — ENCOUNTER SYMPTOMS
OCCASIONAL FEELINGS OF UNSTEADINESS: 0
LOSS OF SENSATION IN FEET: 0

## 2025-05-01 ASSESSMENT — PAIN SCALES - GENERAL: PAINLEVEL_OUTOF10: 2

## 2025-05-01 ASSESSMENT — PAIN - FUNCTIONAL ASSESSMENT: PAIN_FUNCTIONAL_ASSESSMENT: NO/DENIES PAIN

## 2025-05-01 NOTE — PROGRESS NOTES
History Of Present Illness  Shaheen Washington is a 75 y.o. male presenting with   Chief Complaint   Patient presents with    Follow-up     [facility] [place]     Patient presents follows up for significantly improved chronic low back pain to the right buttock area. The pain is constant, worse with activity and better with rest. The pain is sharp, stabbing and shooting to the RLE and LLE but worse on the RIGHT. Denies LE paresthesias, weakness, saddle anesthesia, bowel or bladder incontinence. To manage this pain the patient has attempted Advil PRN which is effective for him. The patient has undergone nerve blocks at [facility] with Dr. Bob approx 12 years ago. The patients chronic, pulmonary fibrosis, COPD, DLD, GERD, BPH  are stable on medication management. Hx of prostate CA treated with XRT in 2023. Hx of MI 15 years ago.     PAIN SCORE: 2-5/10 at his last visit was a 7/10    PCP: Dr. Kurtz        Past Medical History  He has a past medical history of Prostate cancer (Multi).    Surgical History  He has a past surgical history that includes Other surgical history (12/13/2019); Other surgical history (12/13/2019); Other surgical history (12/13/2019); Other surgical history (10/04/2021); Other surgical history (04/24/2020); Other surgical history (04/24/2020); Other surgical history (06/16/2022); and Other surgical history (06/16/2022).     Social History  He reports that he has quit smoking. His smoking use included cigarettes. He has never been exposed to tobacco smoke. He has never used smokeless tobacco. He reports current alcohol use. He reports that he does not use drugs.    Family History  Family History   Problem Relation Name Age of Onset    No Known Problems Father          Allergies  Penicillins    Review of Systems    All other systems reviewed and negative for any deficits. Pertinent positives and negatives were considered in the medical decision making process.        Physical Exam  /66   Pulse 77   Temp 36.2  °C (97.2 °F)   Resp 15   Wt 82.6 kg (182 lb)   SpO2 96%     General: Pt appears stated age    Eyes: Conjunctiva non-icteric and lids without obvious rash or drooping. Pupils are symmetric.    ENT: External ears and nose appear to be without deformity or rash. No lesions or masses noted. Hearing is grossly intact.    Respiratory: No gasping or shortness of breath noted. No use of accessory muscles noted.    CVS: Extremities show no edema or varicosities    Skin: No rashes or open lesions/ulcers identified on skin. No induration/tightening noted with palpation of skin.     Musculoskeletal: [place] is grossly normal.    Stability: No subluxation noted on movement of bilateral upper extremities or head/neck.     Strength: 5/5 in RLE and 5/5 in LLE     Range of Motion: WNL    Neurologic: Reflexes 2+    Sensation: WNL    Neurologic: Cranial Nerves II thru XII are grossly intact    Psychiatric: Pt is alert and oriented to time, person, and place           Assessment/Plan   1. Lumbar disc herniation        2. Lumbar foraminal stenosis        3. Lumbar neuritis        4. Lumbosacral stenosis             I have provided the patient with a list of physical therapy exercises to learn and perform to strengthen core, maintain stabilization, and reduce pain. We reviewed the exercises in detail and I encouraged them to perform them on a regular basis.  2. I would recommend the pt start on Gabapentin to help with nerve related pain. We discussed the risks, benefits, and side effects to this medication including the mechanism of action and the pt understands and will hold for now.   I extensively reviewed the patients MRI findings (2023 [facility]) in detail, including review of the actual images and provided a detailed explanation of the findings using a spine model. There is a large disc herniation at the L4/5 level with smaller bulging discs at the L3/4 and L5/S1 levels. There is also an anterolisthesis L4 on L5. There is a severe right  sided foraminal stenosis at the L4/5 level.     [facility] [phone]    The patient is a candidate for an LESI L5/S1 to treat low back / neck and radicular pain. I spent time with the patient discussing the risks, benefits, and alternatives to this measure including, but not limited to worsening pain with injection, no improvement/guarantee with the procedure, numbness in the lower extremity and risk of falls post procedure, vagal reaction/ hypotension, feeling dizzy / lightheaded, spinal infection, worsening pre-existing infections, epidural hematoma, epidural abscess, nerve injury, paralysis, spinal fluid leak and spinal headache. The patient understands all of these risks and agrees to proceed with the planned procedure.   We will contact the patients PCP to determine if it is safe to stop ASA 7 days prior to procedure. If Pt is to be bridged on Lovenox they will need to be off of Lovenox for 24 hours prior to the procedure. We did discuss the risks for stopping anticoagulation including, but not limited to any cardiovascular event, embolism, and even death. The patient understands these risks and would like to proceed with the procedure.  His last injection was in March of 2025 and he reported 80-90% relief of hs pain that is ONGOING for now. He is now able to golf, bend, walk, sleep with less pain.       I spent time with the patient reviewing their imaging and discussing the risks benefits and alternatives to the above plan. A total of 30 minutes was spent reviewing the data and greater than 50% of that time was with the patient during the face to face encounter discussing treatment options both surgical, non-surgical, and minimally invasive techniques.     Sincerely,     Stan Aguilar MD, JALEN  Board Certified Anesthesiologist  Board Certified Pain Management Specialist  Clinical Faculty, Department of Anesthesiology and Perioperative Medicine  [facility] [employer]     [facility]  [facility]  [address]  [facility] [address]  [place] [address]     [facility]  [facility]   [address] [place] [address]    [facility]  [address] [place] [address]     Phone: [phone]  Fax: [phone]

## 2025-05-06 ENCOUNTER — APPOINTMENT (OUTPATIENT)
Dept: OTOLARYNGOLOGY | Facility: CLINIC | Age: 75
End: 2025-05-06
Payer: MEDICARE

## 2025-06-09 ENCOUNTER — APPOINTMENT (OUTPATIENT)
Dept: PULMONOLOGY | Facility: CLINIC | Age: 75
End: 2025-06-09
Payer: MEDICARE

## 2025-06-10 ENCOUNTER — APPOINTMENT (OUTPATIENT)
Dept: PULMONOLOGY | Facility: CLINIC | Age: 75
End: 2025-06-10
Payer: MEDICARE

## 2025-06-10 ENCOUNTER — APPOINTMENT (OUTPATIENT)
Dept: OTOLARYNGOLOGY | Facility: CLINIC | Age: 75
End: 2025-06-10
Payer: MEDICARE

## 2025-06-11 ENCOUNTER — OFFICE VISIT (OUTPATIENT)
Dept: PULMONOLOGY | Facility: CLINIC | Age: 75
End: 2025-06-11
Payer: MEDICARE

## 2025-06-11 VITALS
TEMPERATURE: 97 F | WEIGHT: 188.2 LBS | HEART RATE: 84 BPM | RESPIRATION RATE: 18 BRPM | BODY MASS INDEX: 29.48 KG/M2 | OXYGEN SATURATION: 97 % | DIASTOLIC BLOOD PRESSURE: 73 MMHG | SYSTOLIC BLOOD PRESSURE: 140 MMHG

## 2025-06-11 DIAGNOSIS — R06.02 SHORTNESS OF BREATH: Primary | ICD-10-CM

## 2025-06-11 DIAGNOSIS — J84.9 ILD (INTERSTITIAL LUNG DISEASE) (MULTI): ICD-10-CM

## 2025-06-11 PROCEDURE — 1036F TOBACCO NON-USER: CPT | Performed by: INTERNAL MEDICINE

## 2025-06-11 PROCEDURE — 1160F RVW MEDS BY RX/DR IN RCRD: CPT | Performed by: INTERNAL MEDICINE

## 2025-06-11 PROCEDURE — 3078F DIAST BP <80 MM HG: CPT | Performed by: INTERNAL MEDICINE

## 2025-06-11 PROCEDURE — 99214 OFFICE O/P EST MOD 30 MIN: CPT | Performed by: INTERNAL MEDICINE

## 2025-06-11 PROCEDURE — 1159F MED LIST DOCD IN RCRD: CPT | Performed by: INTERNAL MEDICINE

## 2025-06-11 PROCEDURE — 99212 OFFICE O/P EST SF 10 MIN: CPT

## 2025-06-11 PROCEDURE — 3077F SYST BP >= 140 MM HG: CPT | Performed by: INTERNAL MEDICINE

## 2025-06-11 ASSESSMENT — ENCOUNTER SYMPTOMS
ABDOMINAL PAIN: 0
UNEXPECTED WEIGHT CHANGE: 0
SINUS PAIN: 0
HEMATURIA: 0
SPEECH DIFFICULTY: 0
CONSTIPATION: 0
FREQUENCY: 0
STRIDOR: 0
SHORTNESS OF BREATH: 1
TREMORS: 0
COUGH: 1
DYSURIA: 0
EYE DISCHARGE: 0
FATIGUE: 0
FEVER: 0
SLEEP DISTURBANCE: 0
DIFFICULTY URINATING: 0
WEAKNESS: 0
ADENOPATHY: 0
WHEEZING: 0
RHINORRHEA: 0
NUMBNESS: 0
JOINT SWELLING: 0
HEADACHES: 0
LIGHT-HEADEDNESS: 0
ABDOMINAL DISTENTION: 0
NAUSEA: 0
CHOKING: 0
BRUISES/BLEEDS EASILY: 0
APNEA: 0
DIZZINESS: 0
SINUS PRESSURE: 0
FACIAL SWELLING: 0
ARTHRALGIAS: 0
AGITATION: 0
EYE REDNESS: 0
PALPITATIONS: 0
NERVOUS/ANXIOUS: 0

## 2025-06-11 NOTE — PROGRESS NOTES
@PULMONARY FOLLOW-UP@       PROBLEM: Cough and ILD    ASSESSMENT:  The patient is a 75-year-old with a history of coronary disease status post myocardial infarction and stenting.  He has a history of prostate cancer.  He does have some eosinophilia and around 384 with normal PFTs.  He has reflux which is being treated which is helping his cough and he has some interstitial changes which I suspect are post-COVID in nature.  He has no decrease in DLCO normal at 85% predicted along with a normal 6-minute walk without desaturation coupled with a normal FeNO level.  He seems to be responding to treatment for reflux.       PLAN:  The patient will continue present treatment and return in 6 months.  A CT scan will be obtained at that point in time.      HISTORY OF PRESENT ILLNESS:  The patient is a 75-year-old with a history of prostate cancer and was a former smoker.  He has hyperlipidemia and is noted to have chronic sinusitis with nasal polyposis.  He saw Dr. Vargas who noted significant sinus inflammation.  He was continued on fluticasone as well as ipratropium and azelastine.     The CT scan of the sinuses from January 9, 2025 revealed the following  1. Moderate diffuse paranasal sinus disease with obstructions in the  frontoethmoidal and sphenoethmoidal recesses as well as the right  maxillary ostia. Minimal progression of disease since the comparison  exam.  2.Intracranial atherosclerotic vascular disease.       The chest CT scan from January 10, 2025 revealed the following  1.  Multifocal irregular peripheral interstitial thickening and tiny  bulla which may represent mild fibrosis. No additional localized  consolidation.     A CT angiogram from January 15, 2021 revealed the following  No acute pulmonary embolism seen to the segmental level.     Scattered ground-glass opacities and consolidation in the lingula  suggestive of multifocal pneumonia.     Laboratory test from February 8, 2025 outlined an eosinophil  count of 384 with a percent eosinophilia of 6.5%.  On January 18, 2023 his eosinophil percent was 5.6% with a white count of 6.5 thousand     An echocardiogram from January 4, 2024 revealed the following   1. Left ventricular systolic function is normal with a 60-65% estimated ejection fraction.   2. Spectral Doppler shows an impaired relaxation pattern of left ventricular diastolic filling.   3. RVSP within normal limits.   4. There is moderate pulmonic valve regurgitation.     On March 5 2025 at his consultation , the patient reports that he had fairly severe COVID pneumonia in 2021.  He was hospitalized t and also received monoclonal antibodies.  He reports that over the last several years that he has had coughing that comes on in spasms.  It usually is in the morning and it is a nagging cough at times it can be quite severe.  He has been treated for reflux as well as sinus congestion.  Currently is on omeprazole and azelastine.  It has improved somewhat.  In addition, he has been on Flonase.  He does not have any seasonal allergies.  He has had COVID on 3 different occasions.  He worked as a  for 20 years and did have exposure in the overall period of time to burning cellulose and he ended up being hospitalized.  He does not remember any postexposure prolonged respiratory issues.  He has no history of eczema.  He has had a myocardial infarction in the past with 3 stenting.  He smoked for 1 to 2 years in the remote past.  In addition, he notes a sighing respiration at times and perhaps he is more short of breath with strenuous exercise activities.  Probably mMRC grade 1.  He has no muscle aching just not normal degenerative changes.            On March 5, 2025 I summarized how the patient a 74-year-old with underlying coronary disease status post myocardial infarction and stenting.  He also has a history of prostate cancer and is being treated for sinus congestion and postnasal drip.  He has been noted  to have some eosinophilia with eosinophil count on February 8, 2025 being 384.  He has no evidence of pulmonary hypertension on echocardiogram from last year.  He had severe COVID in 2021 and was hospitalized at the interstitial changes noted on his recent CT scan I suspect related to his previous COVID infection.  He has noted other definite clinical indications of diffuse fibrotic lung disease from a systemic disorder.  The cough he has may be related to his sinus congestion or possibly asthma with having eosinophilia.  He also potentially could have reflux.  The interstitial changes are not extensive and appear in a distribution similar to the groundglass changes noted in 2021.  His lungs are clear on auscultation.     On March 11, 2025 I summarized in evaluating the patient for his cough he has some mild interstitial changes which which may be post-COVID related.  He does have some eosinophilia but his FeNO level was normal at 22 and respiratory allergy panel are totally negative.  Interestingly, his cough is 80% better on omeprazole.  This obviously suggests reflux.  His PFTs are entirely normal with a normal DLCO around 85% predicted and he did not desaturate with exercise.  His walk distance was normal and his FeNO level was 22.  Except he has some flattening of the inspiratory curve of the flow-volume curve.  Possibly he has variable extrathoracic obstruction.  I am going to send him to ENT for completeness.  He will follow-up with me in 3 months.     It was noted that laryngoscopy conducted on April 26, 2025 revealed no vocal cord or tracheal dysfunction.  He was felt to have mild reflux.    The patient continues to have marked improvement of the coughing which is worse in the morning.  After around 2 hours or goes away.  He is being treated for the reflux as outlined.  He has no shortness of breath PND orthopnea.  He has been a  over the years and was hospitalized twice for exposures.  He did  have COVID around January 2021 when the first interstitial abnormalities were noted.  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  At times he brings up mucus in the morning.    RX Allergies[1]       Current Medications[2]          Review of Systems   Constitutional:  Negative for fatigue, fever and unexpected weight change.   HENT:  Negative for congestion, facial swelling, nosebleeds, postnasal drip, rhinorrhea, sinus pressure and sinus pain.    Eyes:  Negative for discharge, redness and visual disturbance.   Respiratory:  Positive for cough and shortness of breath. Negative for apnea, choking, wheezing and stridor.    Cardiovascular:  Negative for chest pain, palpitations and leg swelling.   Gastrointestinal:  Negative for abdominal distention, abdominal pain, constipation and nausea.   Endocrine: Negative for cold intolerance and heat intolerance.   Genitourinary:  Negative for difficulty urinating, dysuria, frequency and hematuria.   Musculoskeletal:  Negative for arthralgias, gait problem and joint swelling.   Allergic/Immunologic: Negative for environmental allergies, food allergies and immunocompromised state.   Neurological:  Negative for dizziness, tremors, syncope, speech difficulty, weakness, light-headedness, numbness and headaches.   Hematological:  Negative for adenopathy. Does not bruise/bleed easily.   Psychiatric/Behavioral:  Negative for agitation, behavioral problems and sleep disturbance. The patient is not nervous/anxious.         Vitals:    06/11/25 0850   BP: 140/73   Pulse: 84   Resp: 18   Temp: 36.1 °C (97 °F)   SpO2: 97%        Physical Exam  Vitals reviewed.   Constitutional:       Appearance: Normal appearance.   HENT:      Head: Normocephalic and atraumatic.   Eyes:      Extraocular Movements: Extraocular movements intact.   Cardiovascular:      Rate and Rhythm: Normal rate and regular rhythm.      Heart sounds: No murmur heard.     No  friction rub. No gallop.   Pulmonary:      Effort: Pulmonary effort is normal. No respiratory distress.      Breath sounds: Normal breath sounds. No stridor. No wheezing, rhonchi or rales.   Chest:      Chest wall: No tenderness.   Abdominal:      General: Abdomen is flat. There is no distension.      Palpations: Abdomen is soft. There is no mass.      Tenderness: There is no abdominal tenderness.   Musculoskeletal:         General: Normal range of motion.      Cervical back: Normal range of motion.      Right lower leg: No edema.      Left lower leg: No edema.   Skin:     General: Skin is warm and dry.   Neurological:      Mental Status: He is alert and oriented to person, place, and time.   Psychiatric:         Mood and Affect: Mood normal.         Behavior: Behavior normal.               [1]   Allergies  Allergen Reactions    Penicillins Unknown     in childhood   [2]   Current Outpatient Medications:     aspirin 81 mg EC tablet, Take 1 tablet (81 mg) by mouth once daily., Disp: , Rfl:     atorvastatin (Lipitor) 80 mg tablet, Take 1 tablet by mouth once daily, Disp: 90 tablet, Rfl: 3    azelastine (Astelin) 137 mcg (0.1 %) nasal spray, Administer 2 sprays into each nostril 2 times a day. Use in each nostril as directed, Disp: 30 mL, Rfl: 11    cyanocobalamin (Vitamin B-12) 1,000 mcg tablet, Take 1 tablet (1,000 mcg) by mouth every other day., Disp: , Rfl:     ergocalciferol (Vitamin D-2) 1.25 MG (21411 UT) capsule, Take 2 tablets by mouth once daily., Disp: , Rfl:     ezetimibe (Zetia) 10 mg tablet, Take 1 tablet (10 mg) by mouth once daily., Disp: 90 tablet, Rfl: 1    fluticasone (Flonase) 50 mcg/actuation nasal spray, Administer 1 spray into each nostril once daily., Disp: 16 g, Rfl: 0    fluticasone (Flonase) 50 mcg/actuation nasal spray, Use 1 spray(s) in each nostril once daily, Disp: 16 g, Rfl: 1    isosorbide mononitrate ER (Imdur) 30 mg 24 hr tablet, Take 1 tablet by mouth once daily, Disp: 90 tablet,  Rfl: 3    magnesium oxide 500 mg capsule, Take 1 capsule (500 mg) by mouth once daily., Disp: , Rfl:     nitroglycerin (Nitrostat) 0.4 mg SL tablet, Place 1 tablet (0.4 mg) under the tongue every 5 minutes if needed for chest pain., Disp: , Rfl:     omeprazole OTC (PriLOSEC OTC) 20 mg EC tablet, Take 1 tablet (20 mg) by mouth once daily in the morning. Take before meals. Do not crush, chew, or split., Disp: , Rfl:     ubidecarenone (COENZYME Q10, BULK, MISC), Take 1 capsule by mouth once daily., Disp: , Rfl:     methocarbamol (Robaxin) 500 mg tablet, Take 1 tablet (500 mg) by mouth 4 times a day as needed for muscle spasms., Disp: 40 tablet, Rfl: 3    Current Facility-Administered Medications:     Tc-99m tetrofosmin (Myoview) injection 11.3 millicurie, 11.3 millicurie, intravenous, Once in imaging, Edie Cevallos MD    Tc-99m tetrofosmin (Myoview) injection 34.3 millicurie, 34.3 millicurie, intravenous, Once in imaging, Edie Cevallos MD

## 2025-06-11 NOTE — PATIENT INSTRUCTIONS
Would continue present meds and treatment;   come back in 6 months;   will get a follow up CT next year

## 2025-06-12 ENCOUNTER — TELEPHONE (OUTPATIENT)
Dept: CARDIOLOGY | Facility: CLINIC | Age: 75
End: 2025-06-12
Payer: MEDICARE

## 2025-06-12 DIAGNOSIS — C61 MALIGNANT NEOPLASM OF PROSTATE (MULTI): ICD-10-CM

## 2025-06-12 NOTE — TELEPHONE ENCOUNTER
PT HAS AN APPOINTMENT  8-5-25  AND WANTS TO KNOW IF DR HUDDLESTON WANTS ANY TESTING DONE BEFORE HIS APPT  BW OR ANYTHING ELSE.

## 2025-06-13 NOTE — TELEPHONE ENCOUNTER
Patient states he is asymptomatic, has routine 6 month follow up. Last OV - 2/2025, no testing/labwork ordered. Made pt aware.

## 2025-07-06 NOTE — PROGRESS NOTES
Sinus & Skull Base Surgery    Chief Complaint:  1.  Chronic sinusitis with nasal polyposis  2.  Postnasal drainage  3.  Deviated nasal septum  4.  Throat clearing, coughing, dysphonia -- improved with omeprazole  5.  Mild pulmonary fibrosis  6.  History of esophageal dilation; no current dysphagia    History Of Present Illness:  Shaheen Washington presents since last being seen 4/8/2025.     He was using ipratropium but this caused dryness and he discontinued it.    He has been evaluated by Radha Thomas with pulmonary and Bin with voice.  He is planning on getting a CT chest in December for pulmonary fibrosis.    After he saw Dr. Steward, he recommended taking liquid Gaviscon after large meals.  He discontinued this about 1 month ago and did not notice a change in his symptoms so he is now using Gaviscon pills.    He can get intermittent nasal drainage that he attributes to his sinuses.  His cough is about 90% improved since his last evaluation with me.    Main Symptoms:  Patient does not have anterior nasal drainage.    Patient has posterior nasal drainage.  Feels sensation in throat.   Patient does not have nasal airway obstruction.  Patient does not have facial pain.  Patient does not have facial pressure.  Patient does not have decreased sense of smell.   Associated Symptoms:  Patient does not have headaches.  Patient has throat clearing.  90% better.  Patient has coughing.  90% better.  Patient has dysphonia.  Minimal; not persistent.   Patient does not have nasal bleeding.    Medications currently on for sinonasal symptoms:  Fluticasone 2 puffs each side in AM  Azelastine 2 puffs each side in BID  Omeprazole 20mg QAM  Generic Claritin Qday  Gaviscon through Dr. Steward PRN  Gaviscon liquid off for 1 month     Active Problems:  Problem List[1]    Past Medical History:  He has a past medical history of Prostate cancer (Multi).    Surgical History:  He has a past surgical history that includes Other  surgical history (12/13/2019); Other surgical history (12/13/2019); Other surgical history (12/13/2019); Other surgical history (10/04/2021); Other surgical history (04/24/2020); Other surgical history (04/24/2020); Other surgical history (06/16/2022); and Other surgical history (06/16/2022).    Family History:  Family History[2]    Social History:  He reports that he has quit smoking. His smoking use included cigarettes. He has never been exposed to tobacco smoke. He has never used smokeless tobacco. He reports current alcohol use. He reports that he does not use drugs.    Allergies:  Penicillins    Current Meds:  Current Medications[3]    Vitals:  Visit Vitals  Smoking Status Former     Physical Exam:  Nose: On external exam there are neither lesions nor asymmetry of the nasal tip/dorsum. On anterior rhinoscopy, visualization posteriorly is limited on anterior examination. For this reason, to adequately evaluate posteriorly for masses, source of epistaxis, polypoid disease, debridement, and/or signs of infections, nasal endoscopy is indicated.  (Please see procedure below.)    SINONASAL ENDOSCOPY (CPT 29884): To better evaluate the patient's symptoms, sinonasal endoscopy is indicated.  After discussion of risks and benefits, and topical decongestion and anesthesia, an endoscope was used to perform nasal endoscopy on each side.  A time out identifying the patient, the procedure, the location of the procedure and any concerns was performed prior to beginning the procedure.    Findings:  Examination of each nasal cavity revealed a normal middle meatus and sphenoethmoid recess without pus or polyps.  He had some septal deviations to each side.  The nasopharynx appeared normal.    Results/Data:  I personally reviewed his pulmonary note from June 11, 2025 outlining further intervention for his lungs.      I reviewed the voice note from Dr. Steward April 24, 2025 outlining management of chronic cough.    I personally  reviewed his CT sinus from January 9, 2025.  This study demonstrated inflammatory changes throughout his paranasal sinuses most notably within each ethmoid cavity.    Provider Impressions:  1.  Chronic sinusitis with nasal polyposis  2.  Postnasal drainage  3.  Deviated nasal septum  4.  Throat clearing, coughing, dysphonia -- improved   5.  Pulmonary fibrosis  6.  History of esophageal dilation; no current dysphagia    Discussion:  Shaheen Washington and I discussed his ongoing symptoms.  I will defer to Dr. Steward, but given his improvement, I think ongoing reflux management is worthwhile.    He had side effects with ipratropium so I recommended continuation of fluticasone and azelastine.  He can, if desired, obtain this over-the-counter in the form of Astepro.  The dosing would remain the same as he is currently using.    At a high level today, we discussed surgical options opening his paranasal sinuses.  This would be more specifically addressing nasal drainage symptoms as a potential contributor to his throat clearing and coughing rather than addressing the cough directly.  Given his overall improvement, he was comfortable deferring surgical options at this point in time.    I recommended repeat assessment in about 12 months.  He was amenable to this and all questions were answered.    Scribe and Provider Attestation  By signing my name below, I, Renny Keller, attest that this documentation has been prepared under the direction and in the presence of Martinez Vargas MD. All medical record entries made by the scribe were at Martinez Vargas MD direction or personally dictated by Martinez Vargas MD. I, Martinez Vargas MD, have reviewed the chart and agree that the record accurately reflects my personal performance of the history, physical exam, discussion and plan.    Signature:  Martinez Vargas MD       [1]   Patient Active Problem List  Diagnosis    Bradycardia    Chest pain, atypical     Colon polyps    Coronary artery disease    Fatigue    Fever    Herpes zoster    History of vitamin D deficiency    Lesion of buccal mucosa    Overweight    Palpitations    Pure hypercholesterolemia    Anorectal polyp    Arthritis    Contusion of finger of left hand    Dysphagia    Effusion of joint of left hand    History of adenomatous polyp of colon    Irregular heart rhythm    Kidney stones    Left hand pain    Lumbar disc herniation    Lumbar foraminal stenosis    Lumbar neuritis    Lumbosacral stenosis    Osteopenia    GERD (gastroesophageal reflux disease)    Shortness of breath    Malignant neoplasm of prostate (Multi)    Laceration of left thumb    Hypertension    External hemorrhoids    Status post total hip replacement, right    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 Use in each nostril as directed, Disp: 30 mL, Rfl: 11    cyanocobalamin (Vitamin B-12) 1,000 mcg tablet, Take 1 tablet (1,000 mcg) by mouth every other day., Disp: , Rfl:     ergocalciferol (Vitamin D-2) 1.25 MG (22359 UT) capsule, Take 2 tablets by mouth once daily., Disp: , Rfl:     ezetimibe (Zetia) 10 mg tablet, Take 1 tablet (10 mg) by mouth once daily., Disp: 90 tablet, Rfl: 1    fluticasone (Flonase) 50 mcg/actuation nasal spray, Administer 1 spray into each nostril once daily., Disp: 16 g, Rfl: 0    fluticasone (Flonase) 50 mcg/actuation nasal spray, Use 1 spray(s) in each nostril once daily, Disp: 16 g, Rfl: 1    isosorbide mononitrate ER (Imdur) 30 mg 24 hr tablet, Take 1 tablet by mouth once daily, Disp:  90 tablet, Rfl: 3    magnesium oxide 500 mg capsule, Take 1 capsule (500 mg) by mouth once daily., Disp: , Rfl:     nitroglycerin (Nitrostat) 0.4 mg SL tablet, Place 1 tablet (0.4 mg) under the tongue every 5 minutes if needed for chest pain., Disp: , Rfl:     omeprazole OTC (PriLOSEC OTC) 20 mg EC tablet, Take 1 tablet (20 mg) by mouth once daily in the morning. Take before meals. Do not crush, chew, or split., Disp: , Rfl:     ubidecarenone (COENZYME Q10, BULK, MISC), Take 1 capsule by mouth once daily., Disp: , Rfl:     methocarbamol (Robaxin) 500 mg tablet, Take 1 tablet (500 mg) by mouth 4 times a day as needed for muscle spasms., Disp: 40 tablet, Rfl: 3    Current Facility-Administered Medications:     Tc-99m tetrofosmin (Myoview) injection 11.3 millicurie, 11.3 millicurie, intravenous, Once in imaging, Edie Cevallos MD    Tc-99m tetrofosmin (Myoview) injection 34.3 millicurie, 34.3 millicurie, intravenous, Once in imaging, Edie Cevallos MD

## 2025-07-08 ENCOUNTER — APPOINTMENT (OUTPATIENT)
Dept: OTOLARYNGOLOGY | Facility: CLINIC | Age: 75
End: 2025-07-08
Payer: MEDICARE

## 2025-07-08 DIAGNOSIS — R09.82 POST-NASAL DRAINAGE: ICD-10-CM

## 2025-07-08 DIAGNOSIS — R05.3 CHRONIC COUGH: ICD-10-CM

## 2025-07-08 DIAGNOSIS — J32.2 CHRONIC ETHMOIDAL SINUSITIS: Primary | ICD-10-CM

## 2025-07-08 PROCEDURE — 31231 NASAL ENDOSCOPY DX: CPT | Performed by: OTOLARYNGOLOGY

## 2025-07-08 PROCEDURE — 1159F MED LIST DOCD IN RCRD: CPT | Performed by: OTOLARYNGOLOGY

## 2025-07-08 PROCEDURE — 99214 OFFICE O/P EST MOD 30 MIN: CPT | Performed by: OTOLARYNGOLOGY

## 2025-07-14 PROBLEM — S60.00XA CONTUSION OF FINGER OF LEFT HAND: Status: RESOLVED | Noted: 2023-08-07 | Resolved: 2025-07-14

## 2025-07-14 PROBLEM — R50.9 FEVER: Status: RESOLVED | Noted: 2023-03-30 | Resolved: 2025-07-14

## 2025-07-14 PROBLEM — E66.3 OVERWEIGHT: Status: RESOLVED | Noted: 2023-03-30 | Resolved: 2025-07-14

## 2025-07-14 PROBLEM — S61.012A LACERATION OF LEFT THUMB: Status: RESOLVED | Noted: 2024-01-27 | Resolved: 2025-07-14

## 2025-07-14 PROBLEM — B02.9 HERPES ZOSTER: Status: RESOLVED | Noted: 2023-03-30 | Resolved: 2025-07-14

## 2025-07-14 PROBLEM — M54.16 LUMBAR NEURITIS: Status: RESOLVED | Noted: 2023-11-08 | Resolved: 2025-07-14

## 2025-07-14 PROBLEM — K13.70 LESION OF BUCCAL MUCOSA: Status: RESOLVED | Noted: 2023-03-30 | Resolved: 2025-07-14

## 2025-07-28 ENCOUNTER — TELEPHONE (OUTPATIENT)
Dept: PAIN MEDICINE | Facility: CLINIC | Age: 75
End: 2025-07-28
Payer: MEDICARE

## 2025-07-29 LAB — PSA SERPL-MCNC: 0.33 NG/ML

## 2025-07-29 NOTE — TELEPHONE ENCOUNTER
Are you able to call pt to address.    Cardiology Medication(s) Refill    Refill request for:    Disp Refills Start End    spironolactone (ALDACTONE) 25 MG tablet 45 tablet 3 7/9/2024 --    Sig - Route: Take HALF tablet by mouth daily. - Oral         [facility]    Last office visit: 2/18/25    Next office visit: 8/28/25    Quantity/#Refills given today: #45 with 3 refills    On Cost Assistance: No:       Last note(s) reviewed.     Medication(s) refilled and sent for co-signature.

## 2025-07-30 DIAGNOSIS — E78.00 PURE HYPERCHOLESTEROLEMIA: ICD-10-CM

## 2025-07-30 RX ORDER — EZETIMIBE 10 MG/1
10 TABLET ORAL DAILY
Qty: 90 TABLET | Refills: 1 | Status: SHIPPED | OUTPATIENT
Start: 2025-07-30

## 2025-08-04 ENCOUNTER — APPOINTMENT (OUTPATIENT)
Age: 75
End: 2025-08-04
Payer: MEDICARE

## 2025-08-04 VITALS
HEART RATE: 72 BPM | DIASTOLIC BLOOD PRESSURE: 78 MMHG | BODY MASS INDEX: 29.57 KG/M2 | SYSTOLIC BLOOD PRESSURE: 131 MMHG | TEMPERATURE: 98.1 F | WEIGHT: 188.8 LBS

## 2025-08-04 DIAGNOSIS — C61 MALIGNANT NEOPLASM OF PROSTATE (MULTI): ICD-10-CM

## 2025-08-04 DIAGNOSIS — R35.0 FREQUENCY OF MICTURITION: Primary | ICD-10-CM

## 2025-08-04 DIAGNOSIS — E78.00 PURE HYPERCHOLESTEROLEMIA: ICD-10-CM

## 2025-08-04 DIAGNOSIS — N52.01 ERECTILE DYSFUNCTION DUE TO ARTERIAL INSUFFICIENCY: ICD-10-CM

## 2025-08-04 DIAGNOSIS — N20.0 CALCULUS OF KIDNEY: ICD-10-CM

## 2025-08-04 DIAGNOSIS — I25.10 CORONARY ARTERY DISEASE INVOLVING NATIVE CORONARY ARTERY OF NATIVE HEART, UNSPECIFIED WHETHER ANGINA PRESENT: ICD-10-CM

## 2025-08-04 DIAGNOSIS — R35.1 NOCTURIA: ICD-10-CM

## 2025-08-04 DIAGNOSIS — I49.9 IRREGULAR HEART RHYTHM: ICD-10-CM

## 2025-08-04 PROCEDURE — 1159F MED LIST DOCD IN RCRD: CPT | Performed by: UROLOGY

## 2025-08-04 PROCEDURE — 3078F DIAST BP <80 MM HG: CPT | Performed by: UROLOGY

## 2025-08-04 PROCEDURE — 1160F RVW MEDS BY RX/DR IN RCRD: CPT | Performed by: UROLOGY

## 2025-08-04 PROCEDURE — 99214 OFFICE O/P EST MOD 30 MIN: CPT | Performed by: UROLOGY

## 2025-08-04 PROCEDURE — 3075F SYST BP GE 130 - 139MM HG: CPT | Performed by: UROLOGY

## 2025-08-05 ENCOUNTER — APPOINTMENT (OUTPATIENT)
Dept: CARDIOLOGY | Facility: CLINIC | Age: 75
End: 2025-08-05
Payer: MEDICARE

## 2025-08-05 RX ORDER — ATORVASTATIN CALCIUM 80 MG/1
80 TABLET, FILM COATED ORAL DAILY
Qty: 90 TABLET | Refills: 1 | Status: SHIPPED | OUTPATIENT
Start: 2025-08-05

## 2025-08-05 RX ORDER — ISOSORBIDE MONONITRATE 30 MG/1
30 TABLET, EXTENDED RELEASE ORAL DAILY
Qty: 90 TABLET | Refills: 1 | Status: SHIPPED | OUTPATIENT
Start: 2025-08-05

## 2025-08-11 ENCOUNTER — OFFICE VISIT (OUTPATIENT)
Dept: CARDIOLOGY | Facility: CLINIC | Age: 75
End: 2025-08-11
Payer: MEDICARE

## 2025-08-11 VITALS
HEART RATE: 72 BPM | BODY MASS INDEX: 29.6 KG/M2 | DIASTOLIC BLOOD PRESSURE: 70 MMHG | SYSTOLIC BLOOD PRESSURE: 124 MMHG | OXYGEN SATURATION: 98 % | WEIGHT: 189 LBS

## 2025-08-11 DIAGNOSIS — E78.00 PURE HYPERCHOLESTEROLEMIA: Primary | ICD-10-CM

## 2025-08-11 DIAGNOSIS — I25.10 CORONARY ARTERY DISEASE INVOLVING NATIVE CORONARY ARTERY OF NATIVE HEART, UNSPECIFIED WHETHER ANGINA PRESENT: ICD-10-CM

## 2025-08-11 PROCEDURE — 99212 OFFICE O/P EST SF 10 MIN: CPT

## 2025-08-11 PROCEDURE — 99213 OFFICE O/P EST LOW 20 MIN: CPT | Performed by: INTERNAL MEDICINE

## 2025-08-11 PROCEDURE — G2211 COMPLEX E/M VISIT ADD ON: HCPCS | Performed by: INTERNAL MEDICINE

## 2025-08-11 PROCEDURE — 1160F RVW MEDS BY RX/DR IN RCRD: CPT | Performed by: INTERNAL MEDICINE

## 2025-08-11 PROCEDURE — 3074F SYST BP LT 130 MM HG: CPT | Performed by: INTERNAL MEDICINE

## 2025-08-11 PROCEDURE — 1159F MED LIST DOCD IN RCRD: CPT | Performed by: INTERNAL MEDICINE

## 2025-08-11 PROCEDURE — 3078F DIAST BP <80 MM HG: CPT | Performed by: INTERNAL MEDICINE

## 2025-08-19 ENCOUNTER — OFFICE VISIT (OUTPATIENT)
Dept: PAIN MEDICINE | Facility: CLINIC | Age: 75
End: 2025-08-19
Payer: MEDICARE

## 2025-08-19 VITALS
TEMPERATURE: 97.2 F | OXYGEN SATURATION: 97 % | SYSTOLIC BLOOD PRESSURE: 144 MMHG | BODY MASS INDEX: 29.66 KG/M2 | HEART RATE: 62 BPM | WEIGHT: 189 LBS | DIASTOLIC BLOOD PRESSURE: 76 MMHG | HEIGHT: 67 IN | RESPIRATION RATE: 15 BRPM

## 2025-08-19 DIAGNOSIS — G89.29 CHRONIC BILATERAL LOW BACK PAIN WITH BILATERAL SCIATICA: ICD-10-CM

## 2025-08-19 DIAGNOSIS — M54.41 CHRONIC BILATERAL LOW BACK PAIN WITH BILATERAL SCIATICA: ICD-10-CM

## 2025-08-19 DIAGNOSIS — M51.26 LUMBAR DISC HERNIATION: Primary | ICD-10-CM

## 2025-08-19 DIAGNOSIS — M48.07 LUMBOSACRAL STENOSIS: ICD-10-CM

## 2025-08-19 DIAGNOSIS — M54.42 CHRONIC BILATERAL LOW BACK PAIN WITH BILATERAL SCIATICA: ICD-10-CM

## 2025-08-19 DIAGNOSIS — M48.061 LUMBAR FORAMINAL STENOSIS: ICD-10-CM

## 2025-08-19 PROCEDURE — 99212 OFFICE O/P EST SF 10 MIN: CPT | Performed by: ANESTHESIOLOGY

## 2025-08-19 ASSESSMENT — PAIN DESCRIPTION - DESCRIPTORS: DESCRIPTORS: ACHING;DISCOMFORT;SORE

## 2025-08-19 ASSESSMENT — PAIN SCALES - GENERAL
PAINLEVEL_OUTOF10: 3
PAINLEVEL_OUTOF10: 3

## 2025-08-19 ASSESSMENT — ENCOUNTER SYMPTOMS
LOSS OF SENSATION IN FEET: 0
OCCASIONAL FEELINGS OF UNSTEADINESS: 1

## 2025-08-19 ASSESSMENT — PAIN - FUNCTIONAL ASSESSMENT: PAIN_FUNCTIONAL_ASSESSMENT: 0-10

## 2026-02-04 ENCOUNTER — APPOINTMENT (OUTPATIENT)
Age: 76
End: 2026-02-04
Payer: MEDICARE

## 2026-07-07 ENCOUNTER — APPOINTMENT (OUTPATIENT)
Dept: OTOLARYNGOLOGY | Facility: CLINIC | Age: 76
End: 2026-07-07
Payer: MEDICARE